# Patient Record
Sex: FEMALE | Race: BLACK OR AFRICAN AMERICAN | NOT HISPANIC OR LATINO
[De-identification: names, ages, dates, MRNs, and addresses within clinical notes are randomized per-mention and may not be internally consistent; named-entity substitution may affect disease eponyms.]

---

## 2020-02-03 ENCOUNTER — RESULT REVIEW (OUTPATIENT)
Age: 45
End: 2020-02-03

## 2022-02-16 ENCOUNTER — APPOINTMENT (OUTPATIENT)
Dept: HUMAN REPRODUCTION | Facility: CLINIC | Age: 47
End: 2022-02-16
Payer: COMMERCIAL

## 2022-02-16 PROCEDURE — 99205 OFFICE O/P NEW HI 60 MIN: CPT | Mod: 25

## 2022-02-16 PROCEDURE — 36415 COLL VENOUS BLD VENIPUNCTURE: CPT

## 2022-02-16 PROCEDURE — 76830 TRANSVAGINAL US NON-OB: CPT

## 2022-02-17 PROBLEM — Z00.00 ENCOUNTER FOR PREVENTIVE HEALTH EXAMINATION: Status: ACTIVE | Noted: 2022-02-17

## 2022-02-21 ENCOUNTER — APPOINTMENT (OUTPATIENT)
Dept: HUMAN REPRODUCTION | Facility: CLINIC | Age: 47
End: 2022-02-21

## 2022-02-23 ENCOUNTER — APPOINTMENT (OUTPATIENT)
Dept: HUMAN REPRODUCTION | Facility: CLINIC | Age: 47
End: 2022-02-23
Payer: COMMERCIAL

## 2022-02-23 PROCEDURE — 76831 ECHO EXAM UTERUS: CPT

## 2022-02-23 PROCEDURE — 58340 CATHETER FOR HYSTEROGRAPHY: CPT

## 2022-02-25 ENCOUNTER — OUTPATIENT (OUTPATIENT)
Dept: OUTPATIENT SERVICES | Facility: HOSPITAL | Age: 47
LOS: 1 days | Discharge: HOME | End: 2022-02-25

## 2022-02-25 ENCOUNTER — OUTPATIENT (OUTPATIENT)
Dept: OUTPATIENT SERVICES | Facility: HOSPITAL | Age: 47
LOS: 1 days | End: 2022-02-25
Payer: COMMERCIAL

## 2022-02-25 ENCOUNTER — APPOINTMENT (OUTPATIENT)
Dept: RADIOLOGY | Facility: HOSPITAL | Age: 47
End: 2022-02-25

## 2022-02-25 DIAGNOSIS — Z00.00 ENCOUNTER FOR GENERAL ADULT MEDICAL EXAMINATION WITHOUT ABNORMAL FINDINGS: ICD-10-CM

## 2022-02-25 DIAGNOSIS — D64.9 ANEMIA, UNSPECIFIED: ICD-10-CM

## 2022-02-25 DIAGNOSIS — N97.1 FEMALE INFERTILITY OF TUBAL ORIGIN: ICD-10-CM

## 2022-02-25 PROCEDURE — 51610 INJECTION FOR BLADDER X-RAY: CPT

## 2022-02-25 PROCEDURE — 74450 X-RAY URETHRA/BLADDER: CPT

## 2022-02-25 PROCEDURE — 74740 X-RAY FEMALE GENITAL TRACT: CPT | Mod: 26

## 2022-02-25 PROCEDURE — 58340 CATHETER FOR HYSTEROGRAPHY: CPT

## 2022-03-01 ENCOUNTER — RESULT REVIEW (OUTPATIENT)
Age: 47
End: 2022-03-01

## 2022-03-01 ENCOUNTER — APPOINTMENT (OUTPATIENT)
Dept: HEMATOLOGY ONCOLOGY | Facility: CLINIC | Age: 47
End: 2022-03-01
Payer: COMMERCIAL

## 2022-03-01 ENCOUNTER — TRANSCRIPTION ENCOUNTER (OUTPATIENT)
Age: 47
End: 2022-03-01

## 2022-03-01 VITALS
WEIGHT: 175 LBS | HEIGHT: 66.5 IN | DIASTOLIC BLOOD PRESSURE: 94 MMHG | HEART RATE: 106 BPM | SYSTOLIC BLOOD PRESSURE: 138 MMHG | OXYGEN SATURATION: 100 % | BODY MASS INDEX: 27.79 KG/M2

## 2022-03-01 LAB
BASOPHILS # BLD AUTO: 0.1 K/UL — SIGNIFICANT CHANGE UP (ref 0–0.2)
BASOPHILS NFR BLD AUTO: 1 % — SIGNIFICANT CHANGE UP (ref 0–2)
EOSINOPHIL # BLD AUTO: 0.2 K/UL — SIGNIFICANT CHANGE UP (ref 0–0.5)
EOSINOPHIL NFR BLD AUTO: 2.4 % — SIGNIFICANT CHANGE UP (ref 0–6)
HCT VFR BLD CALC: 32 % — LOW (ref 34.5–45)
HGB BLD-MCNC: 9.2 G/DL — LOW (ref 11.5–15.5)
LYMPHOCYTES # BLD AUTO: 1.9 K/UL — SIGNIFICANT CHANGE UP (ref 1–3.3)
LYMPHOCYTES # BLD AUTO: 30.7 % — SIGNIFICANT CHANGE UP (ref 13–44)
MCHC RBC-ENTMCNC: 26.2 PG — LOW (ref 27–34)
MCHC RBC-ENTMCNC: 28.7 G/DL — LOW (ref 32–36)
MCV RBC AUTO: 91.2 FL — SIGNIFICANT CHANGE UP (ref 80–100)
MONOCYTES # BLD AUTO: 0.4 K/UL — SIGNIFICANT CHANGE UP (ref 0–0.9)
MONOCYTES NFR BLD AUTO: 6.6 % — SIGNIFICANT CHANGE UP (ref 2–14)
NEUTROPHILS # BLD AUTO: 3.7 K/UL — SIGNIFICANT CHANGE UP (ref 1.8–7.4)
NEUTROPHILS NFR BLD AUTO: 59.2 % — SIGNIFICANT CHANGE UP (ref 43–77)
PLATELET # BLD AUTO: 387 K/UL — SIGNIFICANT CHANGE UP (ref 150–400)
RBC # BLD: 3.51 M/UL — LOW (ref 3.8–5.2)
RBC # FLD: 22.4 % — HIGH (ref 10.3–14.5)
WBC # BLD: 6.3 K/UL — SIGNIFICANT CHANGE UP (ref 3.8–10.5)
WBC # FLD AUTO: 6.3 K/UL — SIGNIFICANT CHANGE UP (ref 3.8–10.5)

## 2022-03-01 PROCEDURE — 99204 OFFICE O/P NEW MOD 45 MIN: CPT

## 2022-03-01 NOTE — CONSULT LETTER
[Dear  ___] : Dear  [unfilled], [Consult Letter:] : I had the pleasure of evaluating your patient, [unfilled]. [Please see my note below.] : Please see my note below. [Consult Closing:] : Thank you very much for allowing me to participate in the care of this patient.  If you have any questions, please do not hesitate to contact me. [Sincerely,] : Sincerely, [DrTa  ___] : Dr. REAVES [FreeTextEntry3] : Tricia Bolden MD\par Medical Oncology/Hematology\par [facility] [place], [facility]\par \par \par [facility] at [facility]\par

## 2022-03-01 NOTE — ASSESSMENT
[FreeTextEntry1] : 45 y/o F patient with  with secondary infertility,  was referred to our office with anemia. Hgb of 7.7 on 22. She's on oral iron but intolerant, causes nausea which thereby causes noncompliance. \par She likely has underlying iron deficiency anemia due to GYN losses +/- inadequate intake from nutrition.\par \par \par Labs on 2022: WBC 6.68, Hgb 7.7, HCT 27.2%, , Vit D 12.3 \par \par Plan: \par Iron panel, b12, folate, cbc today\par Plan for feraheme weekly x 2 for less visits as she lives far \par Follow-up in 6 months

## 2022-03-01 NOTE — HISTORY OF PRESENT ILLNESS
[de-identified] : 47 y/o F patient with  with secondary infertility, was referred to our office with anemia. \par Patient with a Hgb of 7.7 on 22 and was advised to restart oral iron.\par \par Labs on 2022: WBC 6.68, Hgb 7.7, HCT 27.2%, , Vit D 12.3 \par \par Patient presents today for initial visit. \par Notes h/o anemia long standing with intermittent iron use.  Currently on slow release iron but taking not taking it regularly. Does not tolerate well, notes nausea.\par No history of blood transfusions\par Fatigue\par Admits PARK \par Craving for ice in the past month. \par No melena, blood in stool.\par No blood in urine.\par No difficulty breathing. No palpitations \par No weight gain/loss\par Reports leg cramping. Describes "Anthony horse" leg sensation. \par Reports h/o 2 small fibroids\par 3 months of heavy periods in the past year.\par Does not eat red meat. \par \par JAVAD: Dr. Lockett ([facility]) \par \par \par PMHx: Anemia \par FMHx: Denies\par Sx: , abdominoplasty   \par Socx: Never smoke, No alcohol use \par \par

## 2022-03-01 NOTE — ADDENDUM
[FreeTextEntry1] : Documented by Mainor Hunt acting as scribe for Dr. Bolden on 03/01/2022. \par \par All Medical record entries made by the Scribe were at my, Dr. Bolden's, direction and personally dictated by me on 03/01/2022. I have reviewed the chart and agree that the record accurately reflects my personal performance of the history, physical exam, assessment and plan. I have also personally directed, reviewed, and agreed with the discharge instructions.

## 2022-03-03 ENCOUNTER — APPOINTMENT (OUTPATIENT)
Dept: HUMAN REPRODUCTION | Facility: CLINIC | Age: 47
End: 2022-03-03
Payer: COMMERCIAL

## 2022-03-03 PROCEDURE — 99214 OFFICE O/P EST MOD 30 MIN: CPT

## 2022-03-07 LAB
ALBUMIN SERPL ELPH-MCNC: 4.6 G/DL
ALP BLD-CCNC: 71 U/L
ALT SERPL-CCNC: 11 U/L
ANION GAP SERPL CALC-SCNC: 10 MMOL/L
AST SERPL-CCNC: 15 U/L
BILIRUB SERPL-MCNC: 0.3 MG/DL
BUN SERPL-MCNC: 12 MG/DL
CALCIUM SERPL-MCNC: 9.7 MG/DL
CHLORIDE SERPL-SCNC: 104 MMOL/L
CO2 SERPL-SCNC: 27 MMOL/L
CREAT SERPL-MCNC: 0.59 MG/DL
EGFR: 113 ML/MIN/1.73M2
FERRITIN SERPL-MCNC: 36 NG/ML
FOLATE SERPL-MCNC: >20 NG/ML
GLUCOSE SERPL-MCNC: 96 MG/DL
IRON SATN MFR SERPL: 24 %
IRON SERPL-MCNC: 105 UG/DL
POTASSIUM SERPL-SCNC: 4.1 MMOL/L
PROT SERPL-MCNC: 7.6 G/DL
SODIUM SERPL-SCNC: 142 MMOL/L
TIBC SERPL-MCNC: 432 UG/DL
UIBC SERPL-MCNC: 327 UG/DL
VIT B12 SERPL-MCNC: 584 PG/ML

## 2022-03-10 ENCOUNTER — NON-APPOINTMENT (OUTPATIENT)
Age: 47
End: 2022-03-10

## 2022-03-15 ENCOUNTER — APPOINTMENT (OUTPATIENT)
Dept: OBGYN | Facility: CLINIC | Age: 47
End: 2022-03-15
Payer: COMMERCIAL

## 2022-03-15 VITALS
DIASTOLIC BLOOD PRESSURE: 93 MMHG | HEART RATE: 106 BPM | SYSTOLIC BLOOD PRESSURE: 149 MMHG | WEIGHT: 175 LBS | BODY MASS INDEX: 28.12 KG/M2 | HEIGHT: 66 IN

## 2022-03-15 DIAGNOSIS — Z01.419 ENCOUNTER FOR GYNECOLOGICAL EXAMINATION (GENERAL) (ROUTINE) W/OUT ABNORMAL FINDINGS: ICD-10-CM

## 2022-03-15 PROCEDURE — 99386 PREV VISIT NEW AGE 40-64: CPT

## 2022-03-15 NOTE — HISTORY OF PRESENT ILLNESS
[FreeTextEntry1] : 47 y/o  LMP 22 female presents as new patient for annual wellness visit and to establish care. Pt has been attempting pregnancy for 1 yr and is currently seeing JAVAD.\par \par Recent SHG/HTT on 22 reveals mildly enlarged uterine cavity with 12 mm SMC fibroid, 50% in cavity, The fallopian tubes are patent.\par \par Recent labs reveals anemia- last Hb 9.2 Pt follows with heme and plans for Iron infusions\par \par \par  [N] : Patient does not use contraception [Y] : Patient is sexually active [Mammogramdate] : 2020 [PapSmeardate] : 2020

## 2022-03-15 NOTE — PLAN
[FreeTextEntry1] : SM myoma--plan for ART.\par Discussed options of D&C hysteroscopy with resection.  R/A/B discussed.  Pt desires resection of IM myoma.\par Booking done\par -pap done today\par -rx breast mammo/sono\par -colon screening reviewed, age 46\par -rto 1 year

## 2022-03-15 NOTE — END OF VISIT
[FreeTextEntry3] : I, Liz Tapan, acted as a scribe on behalf of Dr. Lauren Hernandez on 03/15/2022.\par \par All medical entries made by the scribe were at my, Dr. Lauren Hernandez, direction and personally dictated by me on 03/15/2022.. I have reviewed the chart and agree that the record accurately reflects my personal performance of the history, physical exam, assessment and plan. I have also personally directed, reviewed, and agreed with the chart.

## 2022-03-17 ENCOUNTER — APPOINTMENT (OUTPATIENT)
Age: 47
End: 2022-03-17

## 2022-03-18 DIAGNOSIS — D50.9 IRON DEFICIENCY ANEMIA, UNSPECIFIED: ICD-10-CM

## 2022-03-24 ENCOUNTER — APPOINTMENT (OUTPATIENT)
Age: 47
End: 2022-03-24

## 2022-03-30 ENCOUNTER — APPOINTMENT (OUTPATIENT)
Dept: OBGYN | Facility: CLINIC | Age: 47
End: 2022-03-30

## 2022-03-31 ENCOUNTER — OUTPATIENT (OUTPATIENT)
Dept: OUTPATIENT SERVICES | Facility: HOSPITAL | Age: 47
LOS: 1 days | End: 2022-03-31

## 2022-03-31 VITALS
RESPIRATION RATE: 16 BRPM | DIASTOLIC BLOOD PRESSURE: 80 MMHG | SYSTOLIC BLOOD PRESSURE: 134 MMHG | TEMPERATURE: 98 F | HEIGHT: 67 IN | OXYGEN SATURATION: 99 % | HEART RATE: 78 BPM | WEIGHT: 177.91 LBS

## 2022-03-31 DIAGNOSIS — Z98.890 OTHER SPECIFIED POSTPROCEDURAL STATES: Chronic | ICD-10-CM

## 2022-03-31 DIAGNOSIS — D25.0 SUBMUCOUS LEIOMYOMA OF UTERUS: ICD-10-CM

## 2022-03-31 DIAGNOSIS — D50.9 IRON DEFICIENCY ANEMIA, UNSPECIFIED: ICD-10-CM

## 2022-03-31 DIAGNOSIS — Z98.891 HISTORY OF UTERINE SCAR FROM PREVIOUS SURGERY: Chronic | ICD-10-CM

## 2022-03-31 LAB
ANION GAP SERPL CALC-SCNC: 13 MMOL/L — SIGNIFICANT CHANGE UP (ref 7–14)
BUN SERPL-MCNC: 17 MG/DL — SIGNIFICANT CHANGE UP (ref 7–23)
CALCIUM SERPL-MCNC: 9.2 MG/DL — SIGNIFICANT CHANGE UP (ref 8.4–10.5)
CHLORIDE SERPL-SCNC: 105 MMOL/L — SIGNIFICANT CHANGE UP (ref 98–107)
CO2 SERPL-SCNC: 24 MMOL/L — SIGNIFICANT CHANGE UP (ref 22–31)
CREAT SERPL-MCNC: 0.59 MG/DL — SIGNIFICANT CHANGE UP (ref 0.5–1.3)
EGFR: 113 ML/MIN/1.73M2 — SIGNIFICANT CHANGE UP
GLUCOSE SERPL-MCNC: 80 MG/DL — SIGNIFICANT CHANGE UP (ref 70–99)
HCG UR QL: NEGATIVE — SIGNIFICANT CHANGE UP
HCT VFR BLD CALC: 36 % — SIGNIFICANT CHANGE UP (ref 34.5–45)
HGB BLD-MCNC: 10.8 G/DL — LOW (ref 11.5–15.5)
MCHC RBC-ENTMCNC: 27.5 PG — SIGNIFICANT CHANGE UP (ref 27–34)
MCHC RBC-ENTMCNC: 30 GM/DL — LOW (ref 32–36)
MCV RBC AUTO: 91.6 FL — SIGNIFICANT CHANGE UP (ref 80–100)
NRBC # BLD: 0 /100 WBCS — SIGNIFICANT CHANGE UP
NRBC # FLD: 0 K/UL — SIGNIFICANT CHANGE UP
PLATELET # BLD AUTO: 326 K/UL — SIGNIFICANT CHANGE UP (ref 150–400)
POTASSIUM SERPL-MCNC: 4.7 MMOL/L — SIGNIFICANT CHANGE UP (ref 3.5–5.3)
POTASSIUM SERPL-SCNC: 4.7 MMOL/L — SIGNIFICANT CHANGE UP (ref 3.5–5.3)
RBC # BLD: 3.93 M/UL — SIGNIFICANT CHANGE UP (ref 3.8–5.2)
RBC # FLD: 17.3 % — HIGH (ref 10.3–14.5)
SODIUM SERPL-SCNC: 142 MMOL/L — SIGNIFICANT CHANGE UP (ref 135–145)
WBC # BLD: 7.13 K/UL — SIGNIFICANT CHANGE UP (ref 3.8–10.5)
WBC # FLD AUTO: 7.13 K/UL — SIGNIFICANT CHANGE UP (ref 3.8–10.5)

## 2022-03-31 RX ORDER — SODIUM CHLORIDE 9 MG/ML
1000 INJECTION, SOLUTION INTRAVENOUS
Refills: 0 | Status: DISCONTINUED | OUTPATIENT
Start: 2022-04-04 | End: 2022-04-18

## 2022-03-31 NOTE — H&P PST ADULT - PROBLEM SELECTOR PLAN 1
Patient tentatively scheduled for dilation curettage hysteroscopy with meño on 04/04/2022.  Pre-op instructions provided. Pt given verbal and written instructions with teach back on  pepcid. Pt verbalized understanding with return demonstration.   Pt strongly advised to follow up with surgeon to discuss COVID testing requirements prior to procedure.   Urine cup provided for day of procedure pregnancy test.

## 2022-03-31 NOTE — H&P PST ADULT - ADMIT DATE
Spoke to Nathalie to let her know she can  [place] [facility] [place]  
31-Mar-2022

## 2022-03-31 NOTE — H&P PST ADULT - HISTORY OF PRESENT ILLNESS
46 year old female with pre op dx of submucous leiomyoma of uterus is scheduled for dilation curettage hysteroscopy with symphion.

## 2022-03-31 NOTE — H&P PST ADULT - NSICDXPASTSURGICALHX_GEN_ALL_CORE_FT
PAST SURGICAL HISTORY:  H/O abdominoplasty 2018    H/O bilateral breast reduction surgery 2018    H/O  section 2010

## 2022-03-31 NOTE — H&P PST ADULT - NS ATTEND AMEND GEN_ALL_CORE FT
R/A/B of D&C hysteroscopy with resection of presumed myoma and EUA by learners discussed with pt including but not limited to infection, bleeding, and injury to bladder/uterus/bowel.

## 2022-04-01 NOTE — ASU PATIENT PROFILE, ADULT - MUTUALITY COMMENT, PROFILE
none Discussed with Pt her ability to have questions answered by dr clifton & anesthesia before going into the OR

## 2022-04-01 NOTE — ASU PATIENT PROFILE, ADULT - FALL HARM RISK - PATIENT NEEDS ASSISTANCE
No assistance needed crestor  Last Written Prescription Date:  11/20/16  Last Fill Quantity: 90,   # refills: 3  Last Office Visit : 2/2/18  Future Office visit:  5/11/18

## 2022-04-03 ENCOUNTER — TRANSCRIPTION ENCOUNTER (OUTPATIENT)
Age: 47
End: 2022-04-03

## 2022-04-04 ENCOUNTER — RESULT REVIEW (OUTPATIENT)
Age: 47
End: 2022-04-04

## 2022-04-04 ENCOUNTER — OUTPATIENT (OUTPATIENT)
Dept: OUTPATIENT SERVICES | Facility: HOSPITAL | Age: 47
LOS: 1 days | Discharge: HOME | End: 2022-04-04
Payer: COMMERCIAL

## 2022-04-04 ENCOUNTER — TRANSCRIPTION ENCOUNTER (OUTPATIENT)
Age: 47
End: 2022-04-04

## 2022-04-04 ENCOUNTER — APPOINTMENT (OUTPATIENT)
Dept: OBGYN | Facility: HOSPITAL | Age: 47
End: 2022-04-04

## 2022-04-04 VITALS
TEMPERATURE: 99 F | SYSTOLIC BLOOD PRESSURE: 136 MMHG | WEIGHT: 177.91 LBS | RESPIRATION RATE: 14 BRPM | OXYGEN SATURATION: 100 % | HEART RATE: 109 BPM | DIASTOLIC BLOOD PRESSURE: 96 MMHG | HEIGHT: 67 IN

## 2022-04-04 VITALS
HEART RATE: 84 BPM | RESPIRATION RATE: 12 BRPM | SYSTOLIC BLOOD PRESSURE: 120 MMHG | TEMPERATURE: 98 F | DIASTOLIC BLOOD PRESSURE: 68 MMHG | OXYGEN SATURATION: 100 %

## 2022-04-04 DIAGNOSIS — D25.0 SUBMUCOUS LEIOMYOMA OF UTERUS: ICD-10-CM

## 2022-04-04 DIAGNOSIS — Z98.890 OTHER SPECIFIED POSTPROCEDURAL STATES: Chronic | ICD-10-CM

## 2022-04-04 DIAGNOSIS — Z98.891 HISTORY OF UTERINE SCAR FROM PREVIOUS SURGERY: Chronic | ICD-10-CM

## 2022-04-04 LAB
CYTOLOGY CVX/VAG DOC THIN PREP: ABNORMAL
HCG UR QL: NEGATIVE — SIGNIFICANT CHANGE UP
HPV HIGH+LOW RISK DNA PNL CVX: NOT DETECTED

## 2022-04-04 PROCEDURE — 88305 TISSUE EXAM BY PATHOLOGIST: CPT | Mod: 26

## 2022-04-04 PROCEDURE — 58561 HYSTEROSCOPY REMOVE MYOMA: CPT | Mod: GC

## 2022-04-04 RX ORDER — OXYCODONE HYDROCHLORIDE 5 MG/1
10 TABLET ORAL ONCE
Refills: 0 | Status: DISCONTINUED | OUTPATIENT
Start: 2022-04-04 | End: 2022-04-04

## 2022-04-04 RX ORDER — FENTANYL CITRATE 50 UG/ML
50 INJECTION INTRAVENOUS
Refills: 0 | Status: DISCONTINUED | OUTPATIENT
Start: 2022-04-04 | End: 2022-04-04

## 2022-04-04 RX ORDER — SODIUM CHLORIDE 9 MG/ML
1000 INJECTION, SOLUTION INTRAVENOUS
Refills: 0 | Status: DISCONTINUED | OUTPATIENT
Start: 2022-04-04 | End: 2022-04-18

## 2022-04-04 RX ORDER — FENTANYL CITRATE 50 UG/ML
25 INJECTION INTRAVENOUS
Refills: 0 | Status: DISCONTINUED | OUTPATIENT
Start: 2022-04-04 | End: 2022-04-04

## 2022-04-04 RX ORDER — OXYCODONE HYDROCHLORIDE 5 MG/1
5 TABLET ORAL ONCE
Refills: 0 | Status: DISCONTINUED | OUTPATIENT
Start: 2022-04-04 | End: 2022-04-04

## 2022-04-04 RX ORDER — ONDANSETRON 8 MG/1
4 TABLET, FILM COATED ORAL ONCE
Refills: 0 | Status: DISCONTINUED | OUTPATIENT
Start: 2022-04-04 | End: 2022-04-18

## 2022-04-04 NOTE — BRIEF OPERATIVE NOTE - OPERATION/FINDINGS
Pt prepped and draped in standard sterile fashion. Cervix was dilated to 7 mm and hysteroscope was placed. Uterus was distilled with normal saline and b/l ostia were visualized.  R anterior submucosal fibroid was visualized and removed via symphion resection. Possible submucosal fibroid tissue visualized on L lateral aspect of the uterus removed with symphion resection. Curettage was conducted. Endometrial curettage was sent to pathology. EBL was minimal. Fluid deficit was 200cc.  There were no complications

## 2022-04-04 NOTE — ASU DISCHARGE PLAN (ADULT/PEDIATRIC) - CARE PROVIDER_API CALL
Lauren Hernandez (MD)  Obstetrics and Gynecology  [facility] [place] [address]  Phone: [phone]  Fax: [phone]  Follow Up Time: 2 weeks

## 2022-04-04 NOTE — ASU DISCHARGE PLAN (ADULT/PEDIATRIC) - PAIN MANAGEMENT
Take over the counter pain medication You received IV Tylenol for pain management at 7. Please DO NOT take any Tylenol (Acetaminophen) containing products, such as Vicodin, Percocet, Excedrin, and cold medications for the next 6 hours (until 1 PM). DO NOT TAKE MORE THAN 4000 MG OF TYLENOL in a 24 hour period./Take over the counter pain medication You received IV Tylenol for pain management at 7. Please DO NOT take any Tylenol (Acetaminophen) containing products, such as Vicodin, Percocet, Excedrin, and cold medications for the next 6 hours (until 1 PM). DO NOT TAKE MORE THAN 4000 MG OF TYLENOL in a 24 hour period.. You received IV Toradol for pain management at 8 am. Please DO NOT take Motrin/Ibuprofen/Advil/Aleve/NSAIDs (Non-Steroidal Anti-Inflammatory Drugs) for the next 6 hours (until 2PM)./Take over the counter pain medication

## 2022-04-04 NOTE — ASU DISCHARGE PLAN (ADULT/PEDIATRIC) - NS MD DC FALL RISK RISK
For information on Fall & Injury Prevention, visit: [email or web address] OR  [email or web address] OR  https://www.cdc.gov/steadi/patient.html

## 2022-04-08 PROBLEM — D25.0 SUBMUCOUS LEIOMYOMA OF UTERUS: Chronic | Status: ACTIVE | Noted: 2022-03-31

## 2022-04-08 PROBLEM — Z86.2 PERSONAL HISTORY OF DISEASES OF THE BLOOD AND BLOOD-FORMING ORGANS AND CERTAIN DISORDERS INVOLVING THE IMMUNE MECHANISM: Chronic | Status: ACTIVE | Noted: 2022-03-31

## 2022-04-14 LAB — SURGICAL PATHOLOGY STUDY: SIGNIFICANT CHANGE UP

## 2022-04-27 ENCOUNTER — APPOINTMENT (OUTPATIENT)
Dept: HUMAN REPRODUCTION | Facility: CLINIC | Age: 47
End: 2022-04-27

## 2022-04-27 ENCOUNTER — APPOINTMENT (OUTPATIENT)
Dept: OBGYN | Facility: CLINIC | Age: 47
End: 2022-04-27

## 2022-04-27 DIAGNOSIS — D25.0 SUBMUCOUS LEIOMYOMA OF UTERUS: ICD-10-CM

## 2022-04-27 NOTE — HISTORY OF PRESENT ILLNESS
[Fever] : no fever [Vomiting] : no vomiting [Vaginal Bleeding] : no vaginal bleeding [Pelvic Pressure] : no pelvic pressure [Pathology reviewed] : pathology reviewed [de-identified] : Telehealth

## 2022-04-27 NOTE — REASON FOR VISIT
[Home] : at home, [unfilled] , at the time of the visit. [Medical Office: ([facility])___] : at the medical office located in  [Verbal consent obtained from patient] : the patient, [unfilled] [Procedure: ___] : Procedure: [unfilled] [Indication: ___] : Indication: [unfilled]

## 2022-06-13 ENCOUNTER — APPOINTMENT (OUTPATIENT)
Age: 47
End: 2022-06-13

## 2022-08-30 ENCOUNTER — OUTPATIENT (OUTPATIENT)
Dept: OUTPATIENT SERVICES | Facility: HOSPITAL | Age: 47
LOS: 1 days | Discharge: HOME | End: 2022-08-30

## 2022-08-30 DIAGNOSIS — D50.9 IRON DEFICIENCY ANEMIA, UNSPECIFIED: ICD-10-CM

## 2022-08-30 DIAGNOSIS — Z98.890 OTHER SPECIFIED POSTPROCEDURAL STATES: Chronic | ICD-10-CM

## 2022-08-30 DIAGNOSIS — Z98.891 HISTORY OF UTERINE SCAR FROM PREVIOUS SURGERY: Chronic | ICD-10-CM

## 2022-09-06 ENCOUNTER — RESULT REVIEW (OUTPATIENT)
Age: 47
End: 2022-09-06

## 2022-09-06 ENCOUNTER — APPOINTMENT (OUTPATIENT)
Dept: HEMATOLOGY ONCOLOGY | Facility: CLINIC | Age: 47
End: 2022-09-06

## 2022-09-06 VITALS
SYSTOLIC BLOOD PRESSURE: 133 MMHG | BODY MASS INDEX: 28.61 KG/M2 | HEART RATE: 89 BPM | OXYGEN SATURATION: 99 % | WEIGHT: 178 LBS | DIASTOLIC BLOOD PRESSURE: 87 MMHG | HEIGHT: 66 IN

## 2022-09-06 LAB
BASOPHILS # BLD AUTO: 0.1 K/UL — SIGNIFICANT CHANGE UP (ref 0–0.2)
BASOPHILS NFR BLD AUTO: 1.3 % — SIGNIFICANT CHANGE UP (ref 0–2)
EOSINOPHIL # BLD AUTO: 0.1 K/UL — SIGNIFICANT CHANGE UP (ref 0–0.5)
EOSINOPHIL NFR BLD AUTO: 2.4 % — SIGNIFICANT CHANGE UP (ref 0–6)
HCT VFR BLD CALC: 34.7 % — SIGNIFICANT CHANGE UP (ref 34.5–45)
HGB BLD-MCNC: 10.8 G/DL — LOW (ref 11.5–15.5)
LYMPHOCYTES # BLD AUTO: 1.8 K/UL — SIGNIFICANT CHANGE UP (ref 1–3.3)
LYMPHOCYTES # BLD AUTO: 29.2 % — SIGNIFICANT CHANGE UP (ref 13–44)
MCHC RBC-ENTMCNC: 29.6 PG — SIGNIFICANT CHANGE UP (ref 27–34)
MCHC RBC-ENTMCNC: 31 G/DL — LOW (ref 32–36)
MCV RBC AUTO: 95.4 FL — SIGNIFICANT CHANGE UP (ref 80–100)
MONOCYTES # BLD AUTO: 0.5 K/UL — SIGNIFICANT CHANGE UP (ref 0–0.9)
MONOCYTES NFR BLD AUTO: 7.5 % — SIGNIFICANT CHANGE UP (ref 2–14)
NEUTROPHILS # BLD AUTO: 3.6 K/UL — SIGNIFICANT CHANGE UP (ref 1.8–7.4)
NEUTROPHILS NFR BLD AUTO: 59.6 % — SIGNIFICANT CHANGE UP (ref 43–77)
PLATELET # BLD AUTO: 252 K/UL — SIGNIFICANT CHANGE UP (ref 150–400)
RBC # BLD: 3.64 M/UL — LOW (ref 3.8–5.2)
RBC # FLD: 11.5 % — SIGNIFICANT CHANGE UP (ref 10.3–14.5)
WBC # BLD: 6.1 K/UL — SIGNIFICANT CHANGE UP (ref 3.8–10.5)
WBC # FLD AUTO: 6.1 K/UL — SIGNIFICANT CHANGE UP (ref 3.8–10.5)

## 2022-09-06 PROCEDURE — 99214 OFFICE O/P EST MOD 30 MIN: CPT

## 2022-09-06 RX ORDER — CHOLECALCIFEROL (VITAMIN D3) 10(400)/ML
160 (50 FE) DROPS ORAL
Refills: 0 | Status: DISCONTINUED | COMMUNITY
End: 2022-09-06

## 2022-09-08 NOTE — CONSULT LETTER
[Dear  ___] : Dear  [unfilled], [Consult Letter:] : I had the pleasure of evaluating your patient, [unfilled]. [Please see my note below.] : Please see my note below. [Consult Closing:] : Thank you very much for allowing me to participate in the care of this patient.  If you have any questions, please do not hesitate to contact me. [Sincerely,] : Sincerely, [DrTa  ___] : Dr. REAEVS [FreeTextEntry3] : Tricia Bolden MD\par Medical Oncology/Hematology\par [facility] [place], [facility]\par \par \par [facility] at [facility]\par

## 2022-09-08 NOTE — PHYSICAL EXAM
[Normal] : RRR, normal S1S2, no murmurs, rubs, gallops [de-identified] : supple [de-identified] : CTA

## 2022-09-08 NOTE — ASSESSMENT
[FreeTextEntry1] : 47 y/o F patient with  with secondary infertility,  was referred to our office with anemia. Hgb of 7.7 on 22. She's on oral iron but intolerant, causes nausea which thereby causes noncompliance. \par She likely has underlying iron deficiency anemia due to GYN losses +/- inadequate intake from nutrition.\par \par \par : WBC 6.68K Hgb 7.7g HCT 27.2%, PLT 323K\par 3/1/2022 WBC 6.3 K HGB 9.2 g HCT 32.0 %  K Ferritin 36 ng/mL B12 584 pg/mL Folate >20.0 ng/mL \par \par S/p Feraheme on 3/17/22 and 3/24/22\par Menorrhagia continues\par Reviewed CBC from today WBC 6.1 K HGB 10.8 g HCT 34.7 %  K \par \par Plan: \par Iron panel, B12/Folate ordered. If iron deficient will plan for Feraheme weekly x 2\par Menorrhagia: f/u with GYN Dr. Hernandez \par Will obtain lab results from 22 from PCP Dr. Bhakta \par Advised GI evaluation: colonoscopy \par Follow up in 3 months with Dr Bolden or sooner if needed

## 2022-09-09 LAB
ALBUMIN SERPL ELPH-MCNC: 4.3 G/DL
ALP BLD-CCNC: 60 U/L
ALT SERPL-CCNC: 21 U/L
ANION GAP SERPL CALC-SCNC: 12 MMOL/L
AST SERPL-CCNC: 24 U/L
BILIRUB SERPL-MCNC: 0.5 MG/DL
BUN SERPL-MCNC: 17 MG/DL
CALCIUM SERPL-MCNC: 9.2 MG/DL
CHLORIDE SERPL-SCNC: 105 MMOL/L
CO2 SERPL-SCNC: 25 MMOL/L
CREAT SERPL-MCNC: 0.59 MG/DL
EGFR: 113 ML/MIN/1.73M2
FERRITIN SERPL-MCNC: 35 NG/ML
FOLATE SERPL-MCNC: >20 NG/ML
GLUCOSE SERPL-MCNC: 93 MG/DL
IRON SATN MFR SERPL: 78 %
IRON SERPL-MCNC: 261 UG/DL
POTASSIUM SERPL-SCNC: 4.1 MMOL/L
PROT SERPL-MCNC: 6.9 G/DL
SODIUM SERPL-SCNC: 142 MMOL/L
TIBC SERPL-MCNC: 335 UG/DL
UIBC SERPL-MCNC: 74 UG/DL
VIT B12 SERPL-MCNC: 689 PG/ML

## 2022-09-13 ENCOUNTER — APPOINTMENT (OUTPATIENT)
Age: 47
End: 2022-09-13

## 2022-09-20 ENCOUNTER — RESULT REVIEW (OUTPATIENT)
Age: 47
End: 2022-09-20

## 2022-09-20 ENCOUNTER — APPOINTMENT (OUTPATIENT)
Age: 47
End: 2022-09-20

## 2022-09-20 LAB
BASOPHILS # BLD AUTO: 0.1 K/UL — SIGNIFICANT CHANGE UP (ref 0–0.2)
BASOPHILS NFR BLD AUTO: 1.4 % — SIGNIFICANT CHANGE UP (ref 0–2)
EOSINOPHIL # BLD AUTO: 0.1 K/UL — SIGNIFICANT CHANGE UP (ref 0–0.5)
EOSINOPHIL NFR BLD AUTO: 2 % — SIGNIFICANT CHANGE UP (ref 0–6)
HCT VFR BLD CALC: 40.1 % — SIGNIFICANT CHANGE UP (ref 34.5–45)
HGB BLD-MCNC: 12.2 G/DL — SIGNIFICANT CHANGE UP (ref 11.5–15.5)
LYMPHOCYTES # BLD AUTO: 2.1 K/UL — SIGNIFICANT CHANGE UP (ref 1–3.3)
LYMPHOCYTES # BLD AUTO: 35.7 % — SIGNIFICANT CHANGE UP (ref 13–44)
MCHC RBC-ENTMCNC: 30.2 PG — SIGNIFICANT CHANGE UP (ref 27–34)
MCHC RBC-ENTMCNC: 30.5 G/DL — LOW (ref 32–36)
MCV RBC AUTO: 99 FL — SIGNIFICANT CHANGE UP (ref 80–100)
MONOCYTES # BLD AUTO: 0.5 K/UL — SIGNIFICANT CHANGE UP (ref 0–0.9)
MONOCYTES NFR BLD AUTO: 7.9 % — SIGNIFICANT CHANGE UP (ref 2–14)
NEUTROPHILS # BLD AUTO: 3.1 K/UL — SIGNIFICANT CHANGE UP (ref 1.8–7.4)
NEUTROPHILS NFR BLD AUTO: 53.1 % — SIGNIFICANT CHANGE UP (ref 43–77)
PLATELET # BLD AUTO: 286 K/UL — SIGNIFICANT CHANGE UP (ref 150–400)
RBC # BLD: 4.05 M/UL — SIGNIFICANT CHANGE UP (ref 3.8–5.2)
RBC # FLD: 11.7 % — SIGNIFICANT CHANGE UP (ref 10.3–14.5)
WBC # BLD: 5.8 K/UL — SIGNIFICANT CHANGE UP (ref 3.8–10.5)
WBC # FLD AUTO: 5.8 K/UL — SIGNIFICANT CHANGE UP (ref 3.8–10.5)

## 2022-11-16 ENCOUNTER — OUTPATIENT (OUTPATIENT)
Dept: OUTPATIENT SERVICES | Facility: HOSPITAL | Age: 47
LOS: 1 days | Discharge: HOME | End: 2022-11-16

## 2022-11-16 DIAGNOSIS — Z98.890 OTHER SPECIFIED POSTPROCEDURAL STATES: Chronic | ICD-10-CM

## 2022-11-16 DIAGNOSIS — Z98.891 HISTORY OF UTERINE SCAR FROM PREVIOUS SURGERY: Chronic | ICD-10-CM

## 2022-11-16 DIAGNOSIS — D50.9 IRON DEFICIENCY ANEMIA, UNSPECIFIED: ICD-10-CM

## 2022-12-21 DIAGNOSIS — D64.9 ANEMIA, UNSPECIFIED: ICD-10-CM

## 2022-12-22 ENCOUNTER — APPOINTMENT (OUTPATIENT)
Dept: HEMATOLOGY ONCOLOGY | Facility: CLINIC | Age: 47
End: 2022-12-22

## 2023-01-05 NOTE — HISTORY OF PRESENT ILLNESS
[facility]  [address] [place] [address]  Dept: [phone]  Dept Fax: [phone]    Sherley Dimas is a 71 y.o. female who presents today for her medical conditions/complaints of   Chief Complaint   Patient presents with    Cough     Dry           HPI:     /76   Pulse 92   Temp 97.4 °F (36.3 °C)   Resp 16   Wt 161 lb 3.2 oz (73.1 kg)   SpO2 98%   BMI 29.72 kg/m²       Cough  This is a new problem. Episode onset: x 2 weeks. The problem has been unchanged. The cough is Non-productive. Pertinent negatives include no chest pain, ear pain, fever, sore throat, shortness of breath or wheezing. She has tried OTC cough suppressant for the symptoms. The treatment provided moderate relief. Her past medical history is significant for bronchitis and pneumonia. Past Medical History:   Diagnosis Date    Anxiety     Diverticulosis of colon     DJD (degenerative joint disease) 1/7/2015    Family history of colon cancer     Generalized OA 1/7/2015    GERD (gastroesophageal reflux disease)     Hyperlipidemia 1/7/2015    Irritable bowel syndrome     Osteoarthritis         Past Surgical History:   Procedure Laterality Date    CHOLECYSTECTOMY      COLONOSCOPY  2004    normal    COLONOSCOPY  4/25/14    diverticulosis    COLONOSCOPY N/A 2/9/2018    COLONOSCOPY POLYPECTOMY COLD BIOPSY performed by Brenda Mirza MD at [address] [address]  02/09/2018    Spasms in the sigmoid colon. No colitis or ileitis seen.     DILATION AND CURETTAGE OF UTERUS  1970    DILATION AND CURETTAGE OF UTERUS  1991    ENDOSCOPY, COLON, DIAGNOSTIC      JOINT REPLACEMENT Right 8/26/2013    hip    TONSILLECTOMY  1954    UPPER GASTROINTESTINAL ENDOSCOPY  2004    normal    UPPER GASTROINTESTINAL ENDOSCOPY  4/25/14    esophagitis with bx       Family History   Problem Relation Age of Onset    Other Mother         had many stomach surgeries COPD Father     Cancer Father         colon    Other Brother         sepsis    Cancer Maternal Grandfather         colon       Social History     Tobacco Use    Smoking status: Former     Packs/day: 1.50     Years: 20.00     Pack years: 30.00     Types: Cigarettes     Quit date: 1990     Years since quittin.0    Smokeless tobacco: Never   Substance Use Topics    Alcohol use: Yes        Prior to Visit Medications    Medication Sig Taking? Authorizing Provider   benzonatate (TESSALON) 200 MG capsule Take 1 capsule by mouth 3 times daily as needed for Cough Yes DERW Patel CNP   pantoprazole (PROTONIX) 40 MG tablet Take 1 tablet by mouth daily Yes [employer], APRN - NP   hydrOXYzine HCl (ATARAX) 25 MG tablet TAKE 1 TABLET BY MOUTH EVERY DAY AS NEEDED FOR ANXIETY Yes Yane Clark MD   rosuvastatin (CRESTOR) 5 MG tablet TAKE 1 TABLET BY MOUTH NIGHTLY Yes Yane Clark MD   aspirin 81 MG EC tablet TAKE 1 TABLET BY MOUTH EVERY DAY Yes Yane Clark MD   ZINC PO Take by mouth Yes Historical Provider, MD   albuterol sulfate HFA (PROAIR HFA) 108 (90 Base) MCG/ACT inhaler Inhale 2 puffs into the lungs every 6 hours as needed for Wheezing Yes DREW eYe CNP   diclofenac sodium (VOLTAREN) 1 % GEL Apply topically 2 times daily Yes DREW Yee CNP   vitamin C (ASCORBIC ACID) 500 MG tablet Take 500 mg by mouth daily Yes Historical Provider, MD   valACYclovir (VALTREX) 1 g tablet Take 1,000 mg by mouth 2 times daily Patient uses PRN Yes Historical Provider, MD   Coenzyme Q10 (COQ10 PO) Take by mouth Yes Historical Provider, MD   Multiple Vitamins-Minerals (MULTIVITAMIN PO) Take by mouth Yes Historical Provider, MD   vitamin D (ERGOCALCIFEROL) 400 UNITS CAPS Take 400 Units by mouth daily Yes Historical Provider, MD   calcium carbonate-vitamin D 600-200 MG-UNIT TABS Take  by mouth.  Yes Historical Provider, MD   Cyanocobalamin (VITAMIN B-12 CR PO) Take by mouth  Patient not taking: No sig reported  Historical Provider, MD       Allergies   Allergen Reactions    Nalbuphine      Other reaction(s): claustophobia; confusion    Sulfa Antibiotics Hives    Formaldehyde Rash    Nickel Rash    Morphine And Related Other (See Comments)    Oxycodone          Subjective:      Review of Systems   Constitutional:  Negative for fever. HENT:  Negative for ear pain, sinus pain, sore throat and voice change. Respiratory:  Positive for cough. Negative for shortness of breath and wheezing. Cardiovascular:  Negative for chest pain and palpitations. Gastrointestinal: Negative. Genitourinary: Negative. Objective:     Physical Exam  Constitutional:       Appearance: Normal appearance. She is not ill-appearing, toxic-appearing or diaphoretic. HENT:      Nose: Nose normal.      Mouth/Throat:      Mouth: Mucous membranes are moist.      Pharynx: Oropharynx is clear. No oropharyngeal exudate or posterior oropharyngeal erythema. Eyes:      Conjunctiva/sclera: Conjunctivae normal.   Cardiovascular:      Rate and Rhythm: Normal rate and regular rhythm. Heart sounds: Normal heart sounds. Pulmonary:      Effort: No tachypnea, accessory muscle usage or respiratory distress. Breath sounds: Normal breath sounds and air entry. No stridor. No wheezing, rhonchi or rales. Skin:     Coloration: Skin is not pale. Neurological:      General: No focal deficit present. Mental Status: She is alert. MEDICAL DECISION MAKING Assessment/Plan:     [employer] was seen today for cough. Diagnoses and all orders for this visit:    Bronchitis  -     POCT Influenza A/B  -     COVID-19; Future  -     benzonatate (TESSALON) 200 MG capsule; Take 1 capsule by mouth 3 times daily as needed for Cough    Encounter for screening for COVID-19  -     COVID-19; Future    Based on the patient's history and exam will treat as bronchitis. She appeared non-toxic.   The patient does not have clinical findings suggestive of pneumonia. There is no abnormal vital signs (pulse is not greater than 100/ minute, respirations are not greater than 24/ minute, temperature is not greater than 38 degrees Celsius, or oxygen saturation less than 95 percent) There is no tachypnea, rales, or signs of parenchymal consolidation on exam. There are no changes in mental status or behavioral changes. Pt to fill and take medications as prescribed. Rest, increase fluids. Return if no improvement in symptoms. Go to the ER for any emergent concern. Results for orders placed or performed in visit on 01/05/23   POCT Influenza A/B   Result Value Ref Range    Influenza A Ab negative     Influenza B Ab negative        Patient counseled:     Patient given educational materials - see patientinstructions. Discussed use, benefit, and side effects of prescribed medications. All patient questions answered. Pt verbalized understanding. Instructed to continue current medications, diet and exercise. Patient agreed with treatment plan. Follow up as directed.      Electronically signed by DREW Gonzales CNP on 1/5/2023 at 5:26 PM [de-identified] : 47 y/o F patient with  with secondary infertility, was referred to our office with anemia. \par Patient with a Hgb of 7.7 on 22 and was advised to restart oral iron.\par \par Labs on 2022: WBC 6.68, Hgb 7.7, HCT 27.2%, , Vit D 12.3 \par \par Patient presents today for initial visit. \par Notes h/o anemia long standing with intermittent iron use.  Currently on slow release iron but taking not taking it regularly. Does not tolerate well, notes nausea.\par No history of blood transfusions\par Fatigue\par Admits PARK \par Craving for ice in the past month. \par No melena, blood in stool.\par No blood in urine.\par No difficulty breathing. No palpitations \par No weight gain/loss\par Reports leg cramping. Describes "Anthony horse" leg sensation. \par Reports h/o 2 small fibroids\par 3 months of heavy periods in the past year.\par Does not eat red meat. \par \par JAVAD: Dr. Lockett ([facility]) \par \par \par PMHx: Anemia \par FMHx: Denies\par Sx: , abdominoplasty   \par Socx: Never smoke, No alcohol use \par \par  [de-identified] : Returns for follow up visit. S/p Feraheme on 3/17/22 and 3/24/22\par \par Reports energy level significantly improved after Feraheme and recently fatigue has returned \par Followed up with PCP Dr Bhakta on 8/25/22, provided CBC results from 8/25/22 visit HGB 12g HCT 38%\par LMP 9/2/22, reports first two days were heavy requiring pad change every 4 hours, now is light, currently on day 3 \par Prior LMP was on 8/5/22, second day was heavy, then was light, lasted for 5 days \par S/p myoma removal in 4/2022 with Dr. Hernandez \par Denies ha, dizziness, dyspnea \par Denies blood in stool, black stool, hematuria \par Does not consume meat \par Good appetite. Denies abdominal pain \par Denies weight loss \par Pending to start IVF fertility in 11/2022\par Feels well

## 2023-10-23 ENCOUNTER — EMERGENCY (EMERGENCY)
Facility: HOSPITAL | Age: 48
LOS: 1 days | Discharge: HOME | End: 2023-10-23
Attending: EMERGENCY MEDICINE
Payer: COMMERCIAL

## 2023-10-23 VITALS
WEIGHT: 164.91 LBS | DIASTOLIC BLOOD PRESSURE: 103 MMHG | TEMPERATURE: 99 F | OXYGEN SATURATION: 100 % | SYSTOLIC BLOOD PRESSURE: 151 MMHG | HEART RATE: 102 BPM | RESPIRATION RATE: 16 BRPM

## 2023-10-23 DIAGNOSIS — Z98.890 OTHER SPECIFIED POSTPROCEDURAL STATES: Chronic | ICD-10-CM

## 2023-10-23 DIAGNOSIS — Z98.891 HISTORY OF UTERINE SCAR FROM PREVIOUS SURGERY: Chronic | ICD-10-CM

## 2023-10-23 LAB
ALBUMIN SERPL ELPH-MCNC: 4.7 G/DL — SIGNIFICANT CHANGE UP (ref 3.3–5)
ALBUMIN SERPL ELPH-MCNC: 4.7 G/DL — SIGNIFICANT CHANGE UP (ref 3.3–5)
ALP SERPL-CCNC: 45 U/L — SIGNIFICANT CHANGE UP (ref 40–120)
ALP SERPL-CCNC: 45 U/L — SIGNIFICANT CHANGE UP (ref 40–120)
ALT FLD-CCNC: 28 U/L — SIGNIFICANT CHANGE UP (ref 10–45)
ALT FLD-CCNC: 28 U/L — SIGNIFICANT CHANGE UP (ref 10–45)
ANION GAP SERPL CALC-SCNC: 14 MMOL/L — SIGNIFICANT CHANGE UP (ref 5–17)
ANION GAP SERPL CALC-SCNC: 14 MMOL/L — SIGNIFICANT CHANGE UP (ref 5–17)
APPEARANCE UR: CLEAR — SIGNIFICANT CHANGE UP
APPEARANCE UR: CLEAR — SIGNIFICANT CHANGE UP
APTT BLD: 31.2 SEC — SIGNIFICANT CHANGE UP (ref 24.5–35.6)
APTT BLD: 31.2 SEC — SIGNIFICANT CHANGE UP (ref 24.5–35.6)
AST SERPL-CCNC: 101 U/L — HIGH (ref 10–40)
AST SERPL-CCNC: 101 U/L — HIGH (ref 10–40)
BACTERIA # UR AUTO: NEGATIVE — SIGNIFICANT CHANGE UP
BACTERIA # UR AUTO: NEGATIVE — SIGNIFICANT CHANGE UP
BASOPHILS # BLD AUTO: 0.05 K/UL — SIGNIFICANT CHANGE UP (ref 0–0.2)
BASOPHILS # BLD AUTO: 0.05 K/UL — SIGNIFICANT CHANGE UP (ref 0–0.2)
BASOPHILS NFR BLD AUTO: 0.7 % — SIGNIFICANT CHANGE UP (ref 0–2)
BASOPHILS NFR BLD AUTO: 0.7 % — SIGNIFICANT CHANGE UP (ref 0–2)
BILIRUB SERPL-MCNC: 0.6 MG/DL — SIGNIFICANT CHANGE UP (ref 0.2–1.2)
BILIRUB SERPL-MCNC: 0.6 MG/DL — SIGNIFICANT CHANGE UP (ref 0.2–1.2)
BILIRUB UR-MCNC: NEGATIVE — SIGNIFICANT CHANGE UP
BILIRUB UR-MCNC: NEGATIVE — SIGNIFICANT CHANGE UP
BUN SERPL-MCNC: 11 MG/DL — SIGNIFICANT CHANGE UP (ref 7–23)
BUN SERPL-MCNC: 11 MG/DL — SIGNIFICANT CHANGE UP (ref 7–23)
CALCIUM SERPL-MCNC: 9.6 MG/DL — SIGNIFICANT CHANGE UP (ref 8.4–10.5)
CALCIUM SERPL-MCNC: 9.6 MG/DL — SIGNIFICANT CHANGE UP (ref 8.4–10.5)
CHLORIDE SERPL-SCNC: 101 MMOL/L — SIGNIFICANT CHANGE UP (ref 96–108)
CHLORIDE SERPL-SCNC: 101 MMOL/L — SIGNIFICANT CHANGE UP (ref 96–108)
CO2 SERPL-SCNC: 16 MMOL/L — LOW (ref 22–31)
CO2 SERPL-SCNC: 16 MMOL/L — LOW (ref 22–31)
COLOR SPEC: COLORLESS — SIGNIFICANT CHANGE UP
COLOR SPEC: COLORLESS — SIGNIFICANT CHANGE UP
CREAT SERPL-MCNC: 0.48 MG/DL — LOW (ref 0.5–1.3)
CREAT SERPL-MCNC: 0.48 MG/DL — LOW (ref 0.5–1.3)
DIFF PNL FLD: ABNORMAL
DIFF PNL FLD: ABNORMAL
EGFR: 118 ML/MIN/1.73M2 — SIGNIFICANT CHANGE UP
EGFR: 118 ML/MIN/1.73M2 — SIGNIFICANT CHANGE UP
EOSINOPHIL # BLD AUTO: 0.11 K/UL — SIGNIFICANT CHANGE UP (ref 0–0.5)
EOSINOPHIL # BLD AUTO: 0.11 K/UL — SIGNIFICANT CHANGE UP (ref 0–0.5)
EOSINOPHIL NFR BLD AUTO: 1.5 % — SIGNIFICANT CHANGE UP (ref 0–6)
EOSINOPHIL NFR BLD AUTO: 1.5 % — SIGNIFICANT CHANGE UP (ref 0–6)
EPI CELLS # UR: 3 /HPF — SIGNIFICANT CHANGE UP
EPI CELLS # UR: 3 /HPF — SIGNIFICANT CHANGE UP
GLUCOSE SERPL-MCNC: 89 MG/DL — SIGNIFICANT CHANGE UP (ref 70–99)
GLUCOSE SERPL-MCNC: 89 MG/DL — SIGNIFICANT CHANGE UP (ref 70–99)
GLUCOSE UR QL: NEGATIVE — SIGNIFICANT CHANGE UP
GLUCOSE UR QL: NEGATIVE — SIGNIFICANT CHANGE UP
HCG SERPL-ACNC: 11.3 MIU/ML — HIGH
HCG SERPL-ACNC: 11.3 MIU/ML — HIGH
HCT VFR BLD CALC: 40.9 % — SIGNIFICANT CHANGE UP (ref 34.5–45)
HCT VFR BLD CALC: 40.9 % — SIGNIFICANT CHANGE UP (ref 34.5–45)
HGB BLD-MCNC: 13 G/DL — SIGNIFICANT CHANGE UP (ref 11.5–15.5)
HGB BLD-MCNC: 13 G/DL — SIGNIFICANT CHANGE UP (ref 11.5–15.5)
HYALINE CASTS # UR AUTO: 1 /LPF — SIGNIFICANT CHANGE UP (ref 0–2)
HYALINE CASTS # UR AUTO: 1 /LPF — SIGNIFICANT CHANGE UP (ref 0–2)
IMM GRANULOCYTES NFR BLD AUTO: 0.3 % — SIGNIFICANT CHANGE UP (ref 0–0.9)
IMM GRANULOCYTES NFR BLD AUTO: 0.3 % — SIGNIFICANT CHANGE UP (ref 0–0.9)
INR BLD: 1.07 RATIO — SIGNIFICANT CHANGE UP (ref 0.85–1.18)
INR BLD: 1.07 RATIO — SIGNIFICANT CHANGE UP (ref 0.85–1.18)
KETONES UR-MCNC: NEGATIVE — SIGNIFICANT CHANGE UP
KETONES UR-MCNC: NEGATIVE — SIGNIFICANT CHANGE UP
LEUKOCYTE ESTERASE UR-ACNC: NEGATIVE — SIGNIFICANT CHANGE UP
LEUKOCYTE ESTERASE UR-ACNC: NEGATIVE — SIGNIFICANT CHANGE UP
LYMPHOCYTES # BLD AUTO: 2.26 K/UL — SIGNIFICANT CHANGE UP (ref 1–3.3)
LYMPHOCYTES # BLD AUTO: 2.26 K/UL — SIGNIFICANT CHANGE UP (ref 1–3.3)
LYMPHOCYTES # BLD AUTO: 30.7 % — SIGNIFICANT CHANGE UP (ref 13–44)
LYMPHOCYTES # BLD AUTO: 30.7 % — SIGNIFICANT CHANGE UP (ref 13–44)
MCHC RBC-ENTMCNC: 28.4 PG — SIGNIFICANT CHANGE UP (ref 27–34)
MCHC RBC-ENTMCNC: 28.4 PG — SIGNIFICANT CHANGE UP (ref 27–34)
MCHC RBC-ENTMCNC: 31.8 GM/DL — LOW (ref 32–36)
MCHC RBC-ENTMCNC: 31.8 GM/DL — LOW (ref 32–36)
MCV RBC AUTO: 89.5 FL — SIGNIFICANT CHANGE UP (ref 80–100)
MCV RBC AUTO: 89.5 FL — SIGNIFICANT CHANGE UP (ref 80–100)
MONOCYTES # BLD AUTO: 0.43 K/UL — SIGNIFICANT CHANGE UP (ref 0–0.9)
MONOCYTES # BLD AUTO: 0.43 K/UL — SIGNIFICANT CHANGE UP (ref 0–0.9)
MONOCYTES NFR BLD AUTO: 5.9 % — SIGNIFICANT CHANGE UP (ref 2–14)
MONOCYTES NFR BLD AUTO: 5.9 % — SIGNIFICANT CHANGE UP (ref 2–14)
NEUTROPHILS # BLD AUTO: 4.48 K/UL — SIGNIFICANT CHANGE UP (ref 1.8–7.4)
NEUTROPHILS # BLD AUTO: 4.48 K/UL — SIGNIFICANT CHANGE UP (ref 1.8–7.4)
NEUTROPHILS NFR BLD AUTO: 60.9 % — SIGNIFICANT CHANGE UP (ref 43–77)
NEUTROPHILS NFR BLD AUTO: 60.9 % — SIGNIFICANT CHANGE UP (ref 43–77)
NITRITE UR-MCNC: NEGATIVE — SIGNIFICANT CHANGE UP
NITRITE UR-MCNC: NEGATIVE — SIGNIFICANT CHANGE UP
NRBC # BLD: 0 /100 WBCS — SIGNIFICANT CHANGE UP (ref 0–0)
NRBC # BLD: 0 /100 WBCS — SIGNIFICANT CHANGE UP (ref 0–0)
PH UR: 5.5 — SIGNIFICANT CHANGE UP (ref 5–8)
PH UR: 5.5 — SIGNIFICANT CHANGE UP (ref 5–8)
PLATELET # BLD AUTO: 294 K/UL — SIGNIFICANT CHANGE UP (ref 150–400)
PLATELET # BLD AUTO: 294 K/UL — SIGNIFICANT CHANGE UP (ref 150–400)
POTASSIUM SERPL-MCNC: SIGNIFICANT CHANGE UP MMOL/L (ref 3.5–5.3)
POTASSIUM SERPL-MCNC: SIGNIFICANT CHANGE UP MMOL/L (ref 3.5–5.3)
POTASSIUM SERPL-SCNC: SIGNIFICANT CHANGE UP MMOL/L (ref 3.5–5.3)
POTASSIUM SERPL-SCNC: SIGNIFICANT CHANGE UP MMOL/L (ref 3.5–5.3)
PROT SERPL-MCNC: 8.9 G/DL — HIGH (ref 6–8.3)
PROT SERPL-MCNC: 8.9 G/DL — HIGH (ref 6–8.3)
PROT UR-MCNC: NEGATIVE — SIGNIFICANT CHANGE UP
PROT UR-MCNC: NEGATIVE — SIGNIFICANT CHANGE UP
PROTHROM AB SERPL-ACNC: 11.8 SEC — SIGNIFICANT CHANGE UP (ref 9.5–13)
PROTHROM AB SERPL-ACNC: 11.8 SEC — SIGNIFICANT CHANGE UP (ref 9.5–13)
RBC # BLD: 4.57 M/UL — SIGNIFICANT CHANGE UP (ref 3.8–5.2)
RBC # BLD: 4.57 M/UL — SIGNIFICANT CHANGE UP (ref 3.8–5.2)
RBC # FLD: 13.3 % — SIGNIFICANT CHANGE UP (ref 10.3–14.5)
RBC # FLD: 13.3 % — SIGNIFICANT CHANGE UP (ref 10.3–14.5)
RBC CASTS # UR COMP ASSIST: 1 /HPF — SIGNIFICANT CHANGE UP (ref 0–4)
RBC CASTS # UR COMP ASSIST: 1 /HPF — SIGNIFICANT CHANGE UP (ref 0–4)
SODIUM SERPL-SCNC: 131 MMOL/L — LOW (ref 135–145)
SODIUM SERPL-SCNC: 131 MMOL/L — LOW (ref 135–145)
SP GR SPEC: 1 — LOW (ref 1.01–1.02)
SP GR SPEC: 1 — LOW (ref 1.01–1.02)
UROBILINOGEN FLD QL: NEGATIVE — SIGNIFICANT CHANGE UP
UROBILINOGEN FLD QL: NEGATIVE — SIGNIFICANT CHANGE UP
WBC # BLD: 7.35 K/UL — SIGNIFICANT CHANGE UP (ref 3.8–10.5)
WBC # BLD: 7.35 K/UL — SIGNIFICANT CHANGE UP (ref 3.8–10.5)
WBC # FLD AUTO: 7.35 K/UL — SIGNIFICANT CHANGE UP (ref 3.8–10.5)
WBC # FLD AUTO: 7.35 K/UL — SIGNIFICANT CHANGE UP (ref 3.8–10.5)
WBC UR QL: 5 /HPF — SIGNIFICANT CHANGE UP (ref 0–5)
WBC UR QL: 5 /HPF — SIGNIFICANT CHANGE UP (ref 0–5)

## 2023-10-23 PROCEDURE — 76817 TRANSVAGINAL US OBSTETRIC: CPT | Mod: 26

## 2023-10-23 PROCEDURE — 76801 OB US < 14 WKS SINGLE FETUS: CPT | Mod: 26

## 2023-10-23 PROCEDURE — 93975 VASCULAR STUDY: CPT | Mod: 26

## 2023-10-23 PROCEDURE — 99284 EMERGENCY DEPT VISIT MOD MDM: CPT

## 2023-10-23 PROCEDURE — 99285 EMERGENCY DEPT VISIT HI MDM: CPT

## 2023-10-23 RX ORDER — ACETAMINOPHEN 500 MG
650 TABLET ORAL ONCE
Refills: 0 | Status: COMPLETED | OUTPATIENT
Start: 2023-10-23 | End: 2023-10-23

## 2023-10-23 RX ADMIN — Medication 650 MILLIGRAM(S): at 23:31

## 2023-10-23 NOTE — CONSULT NOTE ADULT - ASSESSMENT
THIS NOTE IS INCOMPLETE, FINAL RECOMMENDATIONS PENDING DISCUSSION WITH ATTENDING        -No acute GYN intervention required at this time  - Rh (+), no need for rhogam  -Discussed with patient that this pregnancy may represent an early pregnancy, a non-viable pregnancy, or an ectopic pregnancy  -Given the morbidity associated w/ ectopic pregnancy, we will follow the patient closely to trend the b-hcg levels and obtain repeat US as indicated; counseled the patient extensively re: the importance of follow-up  -Patient to return in 48 hours for a repeat b-hcg/sono in the ED or at our clinic (she can call [phone] to make an appointment if her insurance is accepted)  -Precautions given  -All questions answered to the apparent satisfaction of the patient    Patient seen and d/w   Meghana Rodrigez, PGY2   47y  at 5w2d by LMP 23 who presents with vaginal bleeding and cramping x 1 day. TVUS consistent with PUL. Pt with no active bleeding on exam, clinically and hemodynamically stable.    -No acute GYN intervention required at this time  -Please obtain type and screen, if Rh(-), will need rhogam  -Discussed with patient that this pregnancy may represent an early pregnancy, a non-viable pregnancy, or an ectopic pregnancy  -Given the morbidity associated w/ ectopic pregnancy, we will follow the patient closely to trend the b-hcg levels and obtain repeat US as indicated; counseled the patient extensively re: the importance of follow-up  -Patient to return in 48 hours for a repeat b-hcg/sono in the ED or in Dr. Hernandez's office  - return precautions reviewed: should patient develop fever, heavy vaginal bleeding (saturating a pad an hour for two hours), purulent discharge, worsening abdominal pain, she should return to the ED  -All questions answered to the apparent satisfaction of the patient    Patient seen and d/w Jocelyne Rodrigez, PGY2   47y  at 5w2d by LMP 23 who presents with vaginal bleeding and cramping x 1 day. TVUS consistent with PUL. Pt with no active bleeding on exam, clinically and hemodynamically stable.    -No acute GYN intervention required at this time  -Please obtain type and screen, if Rh(-), will need rhogam  -Discussed with patient that this pregnancy may represent an early pregnancy, a non-viable pregnancy, or an ectopic pregnancy  -Given the morbidity associated w/ ectopic pregnancy, we will follow the patient closely to trend the b-hcg levels and obtain repeat US as indicated; counseled the patient extensively re: the importance of follow-up  -Patient to return in 48 hours for a repeat b-hcg/sono in the ED or in Dr. Hernandez's office  - return precautions reviewed: should patient develop fever, heavy vaginal bleeding (saturating a pad an hour for two hours), purulent discharge, worsening abdominal pain, she should return to the ED  -All questions answered to the apparent satisfaction of the patient    Patient d/w Dr. Jocelyne Rodrigez, PGY2

## 2023-10-23 NOTE — ED PROVIDER NOTE - OBJECTIVE STATEMENT
47-year-old female  approximately 6 weeks pregnant per last menstrual period on  presenting to the emergency department with vaginal bleeding and lower abdominal cramping and back pain.  Patient reports that she took a at home pregnancy test where she confirmed her pregnancy.  Patient reports that initially it was mild bleeding and spotting but the flow has increased throughout the day.  Patient reports that the pain was mostly on the left side.  The patient does not have a confirmed IUP as of yet. the patient denies any passage of clots.  The patient denies nausea, vomiting, fevers, chills, chest pain, shortness of breath, headache or visual changes.

## 2023-10-23 NOTE — ED PROVIDER NOTE - NSFOLLOWUPINSTRUCTIONS_ED_ALL_ED_FT
Please follow up with your OBGYN with 2 days for repeat blood work, HCG and BMP.   Return to the ER for any new or concerning symptoms.     You may take 650 mg acetaminophen six hours as needed for pain.   Drink plenty of fluids and rest.    Miscarriage  A miscarriage is the loss of a pregnancy before the 20th week of pregnancy. Sometimes, a pregnancy ends before a woman knows that she is pregnant.    If you lose a pregnancy, talk with your doctor about:  Questions you have about the loss of your baby.  How to work through your grief.  Plans for future pregnancy.  What are the causes?  Many times, the cause of this condition is not known.    What increases the risk?  These things may make a pregnant woman more likely to lose a pregnancy:    Certain health conditions    Conditions that affect hormones, such as:  Thyroid disease.  Polycystic ovary syndrome.  Diabetes.  A disease that causes the body's disease-fighting system to attack itself by mistake.  Infections.  Bleeding problems.  Being very overweight.  Lifestyle factors    Using products that have tobacco or nicotine in them.  Being around tobacco smoke.  Having alcohol.  Having a lot of caffeine.  Using drugs.  Problems with reproductive organs or parts    Having a cervix that opens and thins before your due date. The cervix is the lowest part of your womb.  Having Asherman syndrome, which leads to:  Scars in the womb.  The womb being abnormal in shape.  Growths (fibroids) in the womb.  Problems in the body that are present at birth.  Infection of the cervix or womb.  Personal or health history    Injury.  Having lost a pregnancy before.  Being younger than age 18 or older than age 35.  Being around a harmful substance, such as radiation.  Having lead or other heavy metals in:  Things you eat or drink.  The air around you.  Using certain medicines.  What are the signs or symptoms?  Blood or spots of blood coming from the vagina. You may also have cramps or pain.  Pain or cramps in the belly or low back.  Fluid or tissue coming out of the vagina.  How is this treated?  Sometimes, treatment is not needed.    If you need treatment, you may be treated with:  A procedure to open the cervix more and take tissue out of the womb.  Medicines. You may get a shot of medicine called Rho(D) immune globulin.  Follow these instructions at home:  Medicines    Take over-the-counter and prescription medicines only as told by your doctor.  If you were prescribed antibiotic medicine, take it as told by your doctor. Do not stop taking it even if you start to feel better.  Activity    Rest as told by your doctor. Ask your doctor what activities are safe for you.  Have someone help you at home during this time.  General instructions      Watch how much tissue comes out of the vagina.  Watch the size of any blood clots that come out of the vagina.  Do not have sex or douche until your doctor says it is okay.  Do not put things, such as tampons, in your vagina until your doctor says it is okay.  To help you and your partner with grieving:  Talk with your doctor.  See a counselor.  When you are ready, talk with your doctor about:  Things to do for your health.  How you can be healthy if you get pregnant again.  Keep all follow-up visits.  Where to find more information  The American College of Obstetricians and Gynecologists: acog.org  [place] Department of Health and Human Services Office of Women's Health: hrsa.gov/office-womens-health  Contact a doctor if:  You have a fever or chills.  There is bad-smelling fluid coming from the vagina.  You have more bleeding.  Tissue or clots of blood come out of your vagina.  Get help right away if:  You have very bad cramps or pain in your back or belly.  You soak more than 2 large pads in an hour for more than 2 hours.  You get light-headed or weak.  You faint.  You feel sad, and you have sad thoughts a lot of the time.  You think about hurting yourself.  Get help right away if you feel like you may hurt yourself or others, or have thoughts about taking your own life. Go to your nearest emergency room or:  Call your local emergency services (911 in the [place]  Call the National Suicide Prevention Lifeline at 1-944.451.1394 or 506 in the [place] This is open 24 hours a day.  Text the Crisis Text Line at 412383.  Summary  A miscarriage is the loss of a pregnancy before the 20th week of pregnancy. Sometimes, a pregnancy ends before a woman knows that she is pregnant.  Follow instructions from your doctor about medicines and activity.  To help you and your partner with grieving, talk with your doctor or a counselor.  Keep all follow-up visits.

## 2023-10-23 NOTE — ED PROVIDER NOTE - ATTENDING CONTRIBUTION TO CARE
Patient is a 47-year-old female, G2, P1,  LMP 9/16, about 6 weeks pregnant, here for lower abdominal cramping, vaginal bleeding.  Patient states that she had a positive home pregnancy test.  She reports that she has had worsening cramping and bleeding throughout the day today.  She reports that she had left-sided pelvic pain.  Denies any dysuria.  No nausea, vomiting, fevers, chills.  She denies any chest pain or shortness of breath.      VS noted  Gen. no acute distress, Non toxic   HEENT: EOMI, mmm  Lungs: CTAB/L no C/ W /R   CVS: RRR   Abd; Soft non tender, non distended   : done by Dr. Avalos, blood in vaginal vault, left adnexal tenderness  Ext: no edema  Skin: no rash  Neuro AAOx3 non focal clear speech  a/p:  heavy vaginal bleeding and abdominal cramping in setting of positive home pregnancy test.  Patient likely has a missed or complete AB.  Differential also includes bleeding in early pregnancy, ectopic pregnancy.  Plan for labs, UA, TVUS, OB/GYN consult.  - Dimas ESQUIVEL

## 2023-10-23 NOTE — ED PROVIDER NOTE - PATIENT PORTAL LINK FT
You can access the FollowMyHealth Patient Portal offered by [facility] by registering at the following website: [email or web address]. By joining [facility]’s FollowMyHealth portal, you will also be able to view your health information using other applications (apps) compatible with our system.

## 2023-10-23 NOTE — ED PROVIDER NOTE - PHYSICAL EXAMINATION
GENERAL: Awake, alert, NAD  HEENT: NC/AT, moist mucous membranes, PERRL, EOMI  LUNGS: CTAB, no wheezes or crackles   CARDIAC: RRR, no m/r/g  ABDOMEN: Soft, non tender, non distended, no rebound, no guarding  BACK: No midline spinal tenderness, no CVA tenderness  : pooling in the vaginal canal and left lower quadrant tenderness to palpation.  EXT: No edema, no calf tenderness, 2+ DP pulses bilaterally, no deformities.  NEURO: A&Ox3. Moving all extremities.  SKIN: Warm and dry. No rash.  PSYCH: Normal affect.

## 2023-10-23 NOTE — ED PROVIDER NOTE - CHIEF COMPLAINT
Mary Tesfaye is a 53 year old female, [facility]. States she is constantly crying (not crying during assessment.)  + suicidal ideation; states \"I'm homeless and I want to kill myself.\"  + states she hasn't been able to sleep for 3 days.  + back pain, using Flexeril and a medication \"DIC\" not on med list - no relief.   + \"little\" chest pain, intermittent \"small\" - refusing EKG.  Denies SOB.  + numbness in RT fingers - recently had injection in RT hand.  + chronic cough, intermittent, denies that it is worse than usual.    States she is not taking any of her medications except the muscle relaxant and pain medication.   States she does not want any testing, she just wants to be admitted for her mental health.  Requesting to go to [facility].    Visit Vitals  BP (!) 174/98 (BP Location: LUE - Left upper extremity)   Pulse 66   Resp 18   Ht 5' 4\" (1.626 m)   Wt 116.6 kg   LMP 12/13/2016 (Approximate)   BMI 44.11 kg/m²     Patient would like communication of their results via:     Cell Phone:   Telephone Information:   Mobile [phone]     Okay to leave a message containing results? Yes    Medications verified and reviewed.  Allergies verified and reviewed.  Tobacco history verified 8/24/2020.  PCP verified or updated. Nate George MD    Reviewed with charge RN and Dr Jiménez; 911 initiated.  Police x 2 arrived and offered transport to [place]; patient refused ambulance.        The patient is a 47y Female complaining of vaginal bleeding.

## 2023-10-23 NOTE — CONSULT NOTE ADULT - SUBJECTIVE AND OBJECTIVE BOX
VICTOR M MOORE  47y  Female [id number]    HPI:        Name of GYN Physician:   OBHx:    GynHx: Denies fibroids, cysts, endometriosis, STI's, Abnormal pap smears   PMH:  PSH:  Meds:  Allx:  Social History:  Denies smoking use, drug use, alcohol use.   +occasional social alcohol use    Vital Signs Last 24 Hrs  T(C): 37 (23 Oct 2023 17:03), Max: 37 (23 Oct 2023 17:03)  T(F): 98.6 (23 Oct 2023 17:03), Max: 98.6 (23 Oct 2023 17:03)  HR: 102 (23 Oct 2023 17:03) (102 - 102)  BP: 151/103 (23 Oct 2023 17:03) (151/103 - 151/103)  BP(mean): --  RR: 16 (23 Oct 2023 17:03) (16 - 16)  SpO2: 100% (23 Oct 2023 17:03) (100% - 100%)    Parameters below as of 23 Oct 2023 17:03  Patient On (Oxygen Delivery Method): room air        Physical Exam:   General: sitting comfortably in bed, NAD   HEENT: neck supple, full ROM  CV: RRR  Lungs: CTA b/l, good air flow b/l   Back: No CVA tenderness  Abd: Soft, non-tender, non-distended.  Bowel sounds present.    :  No bleeding on pad.    External labia wnl.  Bimanual exam with no cervical motion tenderness, uterus wnl, adnexa non palpable b/l.  Cervix closed vs. Cervix dilated  Speculum Exam: No active bleeding from os.  Physiologic discharge.    Ext: non-tender b/l, no edema     LABS:    hC.3                          13.0   7.35  )-----------( 294      ( 23 Oct 2023 18:47 )             40.9     10-23    131<L>  |  101  |  11  ----------------------------<  89  See Note   |  16<L>  |  0.48<L>    Ca    9.6      23 Oct 2023 18:47    TPro  8.9<H>  /  Alb  4.7  /  TBili  0.6  /  DBili  x   /  AST  101<H>  /  ALT  28  /  AlkPhos  45  10-23    I&O's Detail    PT/INR - ( 23 Oct 2023 18:47 )   PT: 11.8 sec;   INR: 1.07 ratio         PTT - ( 23 Oct 2023 18:47 )  PTT:31.2 sec  Urinalysis Basic - ( 23 Oct 2023 18:47 )    Color: Colorless / Appearance: Clear / S.004 / pH: x  Gluc: 89 mg/dL / Ketone: Negative  / Bili: Negative / Urobili: Negative   Blood: x / Protein: Negative / Nitrite: Negative   Leuk Esterase: Negative / RBC: 1 /hpf / WBC 5 /HPF   Sq Epi: x / Non Sq Epi: x / Bacteria: Negative        RADIOLOGY & ADDITIONAL STUDIES:    < from: US OB <=14 Weeks, First Gestation (10.23.23 @ 20:28) >  ACC: [id number] EXAM:  US OB LES THAN 14 WKS 1ST GEST   ORDERED BY:  SUNDAY MCINTYRE     ACC: [id number] EXAM:  US DPLX PELVIC   ORDERED BY:  SUNDAY MCINTYRE     ACC: [id number] EXAM:  US OB TRANSVAGINAL   ORDERED BY:  SUNDAY MCINTYRE     PROCEDURE DATE:  10/23/2023          INTERPRETATION:  CLINICAL INFORMATION: Vaginal bleeding. Beta-hCG of 11.3.    LMP: 2023    Estimated Gestational Age by LMP: 5 weeks and 2 days    COMPARISON: None available.    Endovaginal pelvic sonogram as perorder. Transabdominal pelvic sonogram   was also performed as part of our standard protocol. Color and Spectral   Doppler was performed.    FINDINGS:  Uterus: 12.0 x 7.2 x 7.5 cm. No gestational sac is identified. Intramural   fibroids measuring 2.4 x 2.1 x 2.1 cm and 2.1 x 2.0 x 1.2 cm. The   endometrium measures 10 mm.    Right ovary: Not visualized.  Left ovary: 3.1 x 1.0 x 2.3 cm. Within normal limits. Normal arterial and   venous waveforms.    Fluid: Trace free fluid in the cul-de-sac.    IMPRESSION:  Fibroid uterus.  Neither intrauterine nor extrauterine gestation is identified. Findings   may represent to early to visualize intrauterine pregnancy, nonvisualized   ectopic versus failed first trimester pregnancy. Continual follow-up   serial beta hCG is recommended with repeat ultrasound as needed by the   trending beta values.    --- End of Report ---          ED WOOTEN MD; Resident Radiologist  This document has been electronically signed.  BARRY NICKERSON MD; Attending Radiologist  This document has been electronically signed. Oct 23 2023  9:25PM    < end of copied text >   VICTOR M MOORE  47y  Female [id number]    HPI:  47y  at 5w2d by LMP 23 who presents with vaginal bleeding and cramping x 1 day. Pt says that her cramping has been intermittent and varies in intensity. She says previously her pain was 10/10 but now it is much improved. She has not needed to take any medication for her pain. She has changed her pad once today and has not noticed any clots or passage of tissue. She denies fever, chills, nausea, vomiting, constipation, diarrhea, dysuria.        Name of GYN Physician: Dr. Hernandez  OBHx:     CS for tracing  GynHx: h/o fibroid uterus s/p hysteroscopic myomectomy. Denies cysts, endometriosis, STI's, Abnormal pap smears   PMH: anemia s/p iron infusions  PSH: hysteroscopic myomectomy   Meds: PNV  Allx: NKDA  Social History:  Denies smoking use, drug use, alcohol use. Pt reports feeling safe at home.    Vital Signs Last 24 Hrs  T(C): 37 (23 Oct 2023 17:03), Max: 37 (23 Oct 2023 17:03)  T(F): 98.6 (23 Oct 2023 17:03), Max: 98.6 (23 Oct 2023 17:03)  HR: 102 (23 Oct 2023 17:03) (102 - 102)  BP: 151/103 (23 Oct 2023 17:03) (151/103 - 151/103)  BP(mean): --  RR: 16 (23 Oct 2023 17:03) (16 - 16)  SpO2: 100% (23 Oct 2023 17:03) (100% - 100%)    Parameters below as of 23 Oct 2023 17:03  Patient On (Oxygen Delivery Method): room air        Physical Exam:   General: sitting comfortably in bed, NAD   CV: well perfused  Lungs: breathing comfortably on RA  Abd: Soft, mildly tender in bilateral lower quadrants. No rebound or guarding. No distension.    : External labia wnl.  Bimanual exam with no cervical motion tenderness, uterus wnl, adnexa non palpable b/l.   Speculum Exam: 20cc of blood in vault. No active bleeding from os.  Ext: non-tender b/l, no edema     LABS:    hC.3                          13.0   7.35  )-----------( 294      ( 23 Oct 2023 18:47 )             40.9     10-23    131<L>  |  101  |  11  ----------------------------<  89  See Note   |  16<L>  |  0.48<L>    Ca    9.6      23 Oct 2023 18:47    TPro  8.9<H>  /  Alb  4.7  /  TBili  0.6  /  DBili  x   /  AST  101<H>  /  ALT  28  /  AlkPhos  45  10-23    I&O's Detail    PT/INR - ( 23 Oct 2023 18:47 )   PT: 11.8 sec;   INR: 1.07 ratio         PTT - ( 23 Oct 2023 18:47 )  PTT:31.2 sec  Urinalysis Basic - ( 23 Oct 2023 18:47 )    Color: Colorless / Appearance: Clear / S.004 / pH: x  Gluc: 89 mg/dL / Ketone: Negative  / Bili: Negative / Urobili: Negative   Blood: x / Protein: Negative / Nitrite: Negative   Leuk Esterase: Negative / RBC: 1 /hpf / WBC 5 /HPF   Sq Epi: x / Non Sq Epi: x / Bacteria: Negative        RADIOLOGY & ADDITIONAL STUDIES:    < from: US OB <=14 Weeks, First Gestation (10.23.23 @ 20:28) >  ACC: [id number] EXAM:  US OB LES THAN 14 WKS 1ST GEST   ORDERED BY:  SUNDAY MCINTYRE     ACC: [id number] EXAM:  US DPLX PELVIC   ORDERED BY:  SUNDAY MCINTYRE     ACC: [id number] EXAM:  US OB TRANSVAGINAL   ORDERED BY:  SUNDAY MCINTYRE     PROCEDURE DATE:  10/23/2023          INTERPRETATION:  CLINICAL INFORMATION: Vaginal bleeding. Beta-hCG of 11.3.    LMP: 2023    Estimated Gestational Age by LMP: 5 weeks and 2 days    COMPARISON: None available.    Endovaginal pelvic sonogram as perorder. Transabdominal pelvic sonogram   was also performed as part of our standard protocol. Color and Spectral   Doppler was performed.    FINDINGS:  Uterus: 12.0 x 7.2 x 7.5 cm. No gestational sac is identified. Intramural   fibroids measuring 2.4 x 2.1 x 2.1 cm and 2.1 x 2.0 x 1.2 cm. The   endometrium measures 10 mm.    Right ovary: Not visualized.  Left ovary: 3.1 x 1.0 x 2.3 cm. Within normal limits. Normal arterial and   venous waveforms.    Fluid: Trace free fluid in the cul-de-sac.    IMPRESSION:  Fibroid uterus.  Neither intrauterine nor extrauterine gestation is identified. Findings   may represent to early to visualize intrauterine pregnancy, nonvisualized   ectopic versus failed first trimester pregnancy. Continual follow-up   serial beta hCG is recommended with repeat ultrasound as needed by the   trending beta values.    --- End of Report ---          ED WOOTEN MD; Resident Radiologist  This document has been electronically signed.  BARRY NICKERSON MD; Attending Radiologist  This document has been electronically signed. Oct 23 2023  9:25PM    < end of copied text >

## 2023-10-23 NOTE — ED ADULT NURSE NOTE - OBJECTIVE STATEMENT
47 y.o F AAO4 ambulatory w.o assist presents to the ED with one day of vaginal bleeding. Pt states she is 5 weeks pregnant, this is her second pregnancy, has one living child who is 12 y.o born full term uncomplicated birth, denies any PMH of miscarriage. 47 y.o F AAO4 ambulatory w.o assist presents to the ED with one day of vaginal bleeding. Pt states she is 5 weeks pregnant, this is her second pregnancy, has one living child who is 12 y.o born full term uncomplicated birth, denies any PMH of miscarriage. Pt denies CP, SOB, HA, vision changes, n/v/d, fevers chills, abdominal pain. Upon assessment, abdomen soft and nontender, +strong peripheral pulses, moving all extremities without difficulty.

## 2023-10-23 NOTE — ED PROVIDER NOTE - RAPID ASSESSMENT
47-year-old female  estimated 5 to 6 weeks pregnant last menstrual period was  presenting to the emergency department for the evaluation of vaginal bleeding that began earlier today with a lower back burning sensation.  She endorses lower pelvic cramping and states the bleeding was initially just spotting but flow has increased throughout the day. she reports pain feels more left sided than right sided.  She has not seen a doctor for confirmation of pregnancy at this juncture however did get a positive home pregnancy test.

## 2023-10-23 NOTE — ED PROVIDER NOTE - PROGRESS NOTE DETAILS
Ansley Avalos, PGY-2 DO:  Patient BMP elevated potassium, will rpt BMP but patient would like to go home. Patient elevation likely secondary to hemolysis. Patient will f/u at outpatient OBGYN for rpt beta an df/u potassium there.

## 2023-10-23 NOTE — ED PROVIDER NOTE - CLINICAL SUMMARY MEDICAL DECISION MAKING FREE TEXT BOX
47-year-old female  approximately 6 weeks pregnant per last menstrual period on  presenting with vaginal bleeding and left lower abdominal pain and back pain.  VS nonactionable.  PE pooling in the vaginal canal and left lower quadrant tenderness to palpation.    The differential is not limited to ectopic pregnancy, inevitable , miscarriage, subchorionic hematoma, normal menses.  Will obtain basic labs, coags, transvaginal ultrasound.  Dispo pending lab imaging and reassessment.

## 2023-10-23 NOTE — ED ADULT TRIAGE NOTE - CHIEF COMPLAINT QUOTE
46 yo F pt 5 weeks pregnant with one prior pregnancy p/w vaginal bleeding and abd cramping today soaking less than 1 pad an hour.

## 2023-10-24 LAB
ANION GAP SERPL CALC-SCNC: 12 MMOL/L — SIGNIFICANT CHANGE UP (ref 5–17)
ANION GAP SERPL CALC-SCNC: 12 MMOL/L — SIGNIFICANT CHANGE UP (ref 5–17)
ANION GAP SERPL CALC-SCNC: 9 MMOL/L — SIGNIFICANT CHANGE UP (ref 5–17)
ANION GAP SERPL CALC-SCNC: 9 MMOL/L — SIGNIFICANT CHANGE UP (ref 5–17)
BUN SERPL-MCNC: 11 MG/DL — SIGNIFICANT CHANGE UP (ref 7–23)
CALCIUM SERPL-MCNC: 9.1 MG/DL — SIGNIFICANT CHANGE UP (ref 8.4–10.5)
CALCIUM SERPL-MCNC: 9.1 MG/DL — SIGNIFICANT CHANGE UP (ref 8.4–10.5)
CALCIUM SERPL-MCNC: 9.4 MG/DL — SIGNIFICANT CHANGE UP (ref 8.4–10.5)
CALCIUM SERPL-MCNC: 9.4 MG/DL — SIGNIFICANT CHANGE UP (ref 8.4–10.5)
CHLORIDE SERPL-SCNC: 104 MMOL/L — SIGNIFICANT CHANGE UP (ref 96–108)
CO2 SERPL-SCNC: 21 MMOL/L — LOW (ref 22–31)
CO2 SERPL-SCNC: 21 MMOL/L — LOW (ref 22–31)
CO2 SERPL-SCNC: 22 MMOL/L — SIGNIFICANT CHANGE UP (ref 22–31)
CO2 SERPL-SCNC: 22 MMOL/L — SIGNIFICANT CHANGE UP (ref 22–31)
CREAT SERPL-MCNC: 0.52 MG/DL — SIGNIFICANT CHANGE UP (ref 0.5–1.3)
CREAT SERPL-MCNC: 0.52 MG/DL — SIGNIFICANT CHANGE UP (ref 0.5–1.3)
CREAT SERPL-MCNC: 0.53 MG/DL — SIGNIFICANT CHANGE UP (ref 0.5–1.3)
CREAT SERPL-MCNC: 0.53 MG/DL — SIGNIFICANT CHANGE UP (ref 0.5–1.3)
CULTURE RESULTS: NO GROWTH — SIGNIFICANT CHANGE UP
CULTURE RESULTS: NO GROWTH — SIGNIFICANT CHANGE UP
EGFR: 115 ML/MIN/1.73M2 — SIGNIFICANT CHANGE UP
EGFR: 115 ML/MIN/1.73M2 — SIGNIFICANT CHANGE UP
EGFR: 116 ML/MIN/1.73M2 — SIGNIFICANT CHANGE UP
EGFR: 116 ML/MIN/1.73M2 — SIGNIFICANT CHANGE UP
GLUCOSE SERPL-MCNC: 101 MG/DL — HIGH (ref 70–99)
GLUCOSE SERPL-MCNC: 101 MG/DL — HIGH (ref 70–99)
GLUCOSE SERPL-MCNC: 103 MG/DL — HIGH (ref 70–99)
GLUCOSE SERPL-MCNC: 103 MG/DL — HIGH (ref 70–99)
POTASSIUM SERPL-MCNC: 5.8 MMOL/L — HIGH (ref 3.5–5.3)
POTASSIUM SERPL-MCNC: 5.8 MMOL/L — HIGH (ref 3.5–5.3)
POTASSIUM SERPL-MCNC: 7.5 MMOL/L — CRITICAL HIGH (ref 3.5–5.3)
POTASSIUM SERPL-MCNC: 7.5 MMOL/L — CRITICAL HIGH (ref 3.5–5.3)
POTASSIUM SERPL-SCNC: 5.8 MMOL/L — HIGH (ref 3.5–5.3)
POTASSIUM SERPL-SCNC: 5.8 MMOL/L — HIGH (ref 3.5–5.3)
POTASSIUM SERPL-SCNC: 7.5 MMOL/L — CRITICAL HIGH (ref 3.5–5.3)
POTASSIUM SERPL-SCNC: 7.5 MMOL/L — CRITICAL HIGH (ref 3.5–5.3)
SODIUM SERPL-SCNC: 135 MMOL/L — SIGNIFICANT CHANGE UP (ref 135–145)
SODIUM SERPL-SCNC: 135 MMOL/L — SIGNIFICANT CHANGE UP (ref 135–145)
SODIUM SERPL-SCNC: 137 MMOL/L — SIGNIFICANT CHANGE UP (ref 135–145)
SODIUM SERPL-SCNC: 137 MMOL/L — SIGNIFICANT CHANGE UP (ref 135–145)
SPECIMEN SOURCE: SIGNIFICANT CHANGE UP
SPECIMEN SOURCE: SIGNIFICANT CHANGE UP

## 2023-10-24 PROCEDURE — 85610 PROTHROMBIN TIME: CPT

## 2023-10-24 PROCEDURE — 76801 OB US < 14 WKS SINGLE FETUS: CPT

## 2023-10-24 PROCEDURE — 36415 COLL VENOUS BLD VENIPUNCTURE: CPT

## 2023-10-24 PROCEDURE — 87086 URINE CULTURE/COLONY COUNT: CPT

## 2023-10-24 PROCEDURE — 81001 URINALYSIS AUTO W/SCOPE: CPT

## 2023-10-24 PROCEDURE — 76817 TRANSVAGINAL US OBSTETRIC: CPT

## 2023-10-24 PROCEDURE — 80048 BASIC METABOLIC PNL TOTAL CA: CPT

## 2023-10-24 PROCEDURE — 99285 EMERGENCY DEPT VISIT HI MDM: CPT | Mod: 25

## 2023-10-24 PROCEDURE — 93975 VASCULAR STUDY: CPT

## 2023-10-24 PROCEDURE — 85730 THROMBOPLASTIN TIME PARTIAL: CPT

## 2023-10-24 PROCEDURE — 85025 COMPLETE CBC W/AUTO DIFF WBC: CPT

## 2023-10-24 PROCEDURE — 80053 COMPREHEN METABOLIC PANEL: CPT

## 2023-10-24 PROCEDURE — 84702 CHORIONIC GONADOTROPIN TEST: CPT

## 2023-10-24 NOTE — CHART NOTE - NSCHARTNOTEFT_GEN_A_CORE
Patient's multiple blood draws showed severe hemolysis on potassium, moderate hemolysis of 5.8 w/ no symptoms at this time. Explained to patient that she will need repeat at OBGYN office when she visits for repeat hcg, no medical history to suggest cause of hyperkalemia at this time, patient understands. Final potassium drawn in ED was severely hemolyzed despite being taken from [facility] blood draw.

## 2023-10-31 ENCOUNTER — APPOINTMENT (OUTPATIENT)
Dept: OBGYN | Facility: CLINIC | Age: 48
End: 2023-10-31
Payer: COMMERCIAL

## 2023-10-31 ENCOUNTER — ASOB RESULT (OUTPATIENT)
Age: 48
End: 2023-10-31

## 2023-10-31 VITALS
BODY MASS INDEX: 27.64 KG/M2 | HEART RATE: 87 BPM | SYSTOLIC BLOOD PRESSURE: 135 MMHG | WEIGHT: 172 LBS | HEIGHT: 66 IN | DIASTOLIC BLOOD PRESSURE: 87 MMHG

## 2023-10-31 DIAGNOSIS — O36.80X0 PREGNANCY WITH INCONCLUSIVE FETAL VIABILITY, NOT APPLICABLE OR UNSPECIFIED: ICD-10-CM

## 2023-10-31 PROCEDURE — 99214 OFFICE O/P EST MOD 30 MIN: CPT

## 2023-10-31 PROCEDURE — 76817 TRANSVAGINAL US OBSTETRIC: CPT

## 2024-01-23 LAB
BACTERIA UR CULT: NORMAL
C TRACH RRNA SPEC QL NAA+PROBE: NOT DETECTED
HCG SERPL-MCNC: <1 MIU/ML
N GONORRHOEA RRNA SPEC QL NAA+PROBE: NOT DETECTED
SOURCE AMPLIFICATION: NORMAL

## 2024-04-03 NOTE — ED PROVIDER NOTE - NS ED MD DISPO DISCHARGE
No protocol for requested medication. Medication: Yuvafem 10 MCG vaginal tablet   Last office visit date: 01/24/2024  Pharmacy: [address]    Order pended, routed to clinician for review.
Home

## 2024-06-12 ENCOUNTER — APPOINTMENT (OUTPATIENT)
Dept: OBGYN | Facility: CLINIC | Age: 49
End: 2024-06-12

## 2025-03-27 ENCOUNTER — APPOINTMENT (OUTPATIENT)
Dept: OBGYN | Facility: CLINIC | Age: 50
End: 2025-03-27

## 2025-03-27 VITALS
BODY MASS INDEX: 27 KG/M2 | WEIGHT: 168 LBS | HEART RATE: 85 BPM | SYSTOLIC BLOOD PRESSURE: 144 MMHG | DIASTOLIC BLOOD PRESSURE: 99 MMHG | HEIGHT: 66 IN

## 2025-03-27 DIAGNOSIS — D64.9 ANEMIA, UNSPECIFIED: ICD-10-CM

## 2025-03-27 DIAGNOSIS — N92.0 EXCESSIVE AND FREQUENT MENSTRUATION WITH REGULAR CYCLE: ICD-10-CM

## 2025-03-27 PROCEDURE — 99213 OFFICE O/P EST LOW 20 MIN: CPT

## 2025-03-27 RX ORDER — TRANEXAMIC ACID 650 MG/1
650 TABLET ORAL
Qty: 30 | Refills: 4 | Status: ACTIVE | COMMUNITY
Start: 2025-03-27 | End: 1900-01-01

## 2025-04-11 ENCOUNTER — APPOINTMENT (OUTPATIENT)
Dept: OBGYN | Facility: CLINIC | Age: 50
End: 2025-04-11
Payer: COMMERCIAL

## 2025-04-11 ENCOUNTER — ASOB RESULT (OUTPATIENT)
Age: 50
End: 2025-04-11

## 2025-04-11 PROCEDURE — 76857 US EXAM PELVIC LIMITED: CPT | Mod: 59

## 2025-04-11 PROCEDURE — 76830 TRANSVAGINAL US NON-OB: CPT

## 2025-04-16 ENCOUNTER — APPOINTMENT (OUTPATIENT)
Dept: OBGYN | Facility: CLINIC | Age: 50
End: 2025-04-16

## 2025-05-15 ENCOUNTER — APPOINTMENT (OUTPATIENT)
Dept: GASTROENTEROLOGY | Facility: CLINIC | Age: 50
End: 2025-05-15
Payer: COMMERCIAL

## 2025-05-15 VITALS
DIASTOLIC BLOOD PRESSURE: 80 MMHG | WEIGHT: 168 LBS | OXYGEN SATURATION: 99 % | BODY MASS INDEX: 27 KG/M2 | TEMPERATURE: 97.5 F | HEART RATE: 86 BPM | SYSTOLIC BLOOD PRESSURE: 120 MMHG | HEIGHT: 66 IN

## 2025-05-15 DIAGNOSIS — D50.9 IRON DEFICIENCY ANEMIA, UNSPECIFIED: ICD-10-CM

## 2025-05-15 DIAGNOSIS — Z12.11 ENCOUNTER FOR SCREENING FOR MALIGNANT NEOPLASM OF COLON: ICD-10-CM

## 2025-05-15 PROCEDURE — 99203 OFFICE O/P NEW LOW 30 MIN: CPT

## 2025-05-15 RX ORDER — SODIUM SULFATE, POTASSIUM SULFATE AND MAGNESIUM SULFATE 1.6; 3.13; 17.5 G/177ML; G/177ML; G/177ML
17.5-3.13-1.6 SOLUTION ORAL
Qty: 1 | Refills: 0 | Status: ACTIVE | COMMUNITY
Start: 2025-05-15 | End: 1900-01-01

## 2025-05-15 RX ORDER — GLUC/MSM/COLGN2/HYAL/ANTIARTH3 375-375-20
TABLET ORAL
Refills: 0 | Status: ACTIVE | COMMUNITY

## 2025-05-15 RX ORDER — MULTIVITAMIN
TABLET ORAL
Refills: 0 | Status: ACTIVE | COMMUNITY

## 2025-05-15 RX ORDER — CHOLECALCIFEROL (VITAMIN D3) 25 MCG
TABLET ORAL
Refills: 0 | Status: ACTIVE | COMMUNITY

## 2025-05-21 ENCOUNTER — NON-APPOINTMENT (OUTPATIENT)
Age: 50
End: 2025-05-21

## 2025-05-21 ENCOUNTER — APPOINTMENT (OUTPATIENT)
Dept: OBGYN | Facility: CLINIC | Age: 50
End: 2025-05-21
Payer: COMMERCIAL

## 2025-05-21 VITALS
SYSTOLIC BLOOD PRESSURE: 148 MMHG | BODY MASS INDEX: 27.64 KG/M2 | HEART RATE: 103 BPM | WEIGHT: 172 LBS | DIASTOLIC BLOOD PRESSURE: 88 MMHG | HEIGHT: 66 IN

## 2025-05-21 DIAGNOSIS — N92.0 EXCESSIVE AND FREQUENT MENSTRUATION WITH REGULAR CYCLE: ICD-10-CM

## 2025-05-21 DIAGNOSIS — Z01.419 ENCOUNTER FOR GYNECOLOGICAL EXAMINATION (GENERAL) (ROUTINE) W/OUT ABNORMAL FINDINGS: ICD-10-CM

## 2025-05-21 PROCEDURE — 58100 BIOPSY OF UTERUS LINING: CPT

## 2025-05-21 PROCEDURE — 99459 PELVIC EXAMINATION: CPT | Mod: NC

## 2025-05-21 PROCEDURE — 96127 BRIEF EMOTIONAL/BEHAV ASSMT: CPT

## 2025-05-21 PROCEDURE — 99396 PREV VISIT EST AGE 40-64: CPT

## 2025-05-22 LAB — HPV HIGH+LOW RISK DNA PNL CVX: NOT DETECTED

## 2025-05-23 LAB — CORE LAB BIOPSY: NORMAL

## 2025-05-24 LAB — CYTOLOGY CVX/VAG DOC THIN PREP: ABNORMAL

## 2025-06-09 ENCOUNTER — APPOINTMENT (OUTPATIENT)
Dept: OBGYN | Facility: CLINIC | Age: 50
End: 2025-06-09

## 2025-06-09 PROCEDURE — 99213 OFFICE O/P EST LOW 20 MIN: CPT | Mod: 95

## 2025-06-09 RX ORDER — NORETHINDRONE ACETATE 5 MG/1
5 TABLET ORAL
Qty: 60 | Refills: 1 | Status: ACTIVE | COMMUNITY
Start: 2025-06-09 | End: 1900-01-01

## (undated) DEVICE — LABELS BLANK W PEN

## (undated) DEVICE — TUBING IRR SET FOR CYSTOSCOPY 77"

## (undated) DEVICE — ELCTR REM POLYHESIVE ADULT PT RETURN 15FT

## (undated) DEVICE — VISITEC 4X4

## (undated) DEVICE — SOL IRR POUR NS 0.9% 1000ML

## (undated) DEVICE — DRAPE LIGHT HANDLE COVER (GREEN)

## (undated) DEVICE — GOWN LG

## (undated) DEVICE — CABLE DAC ACTIVE CORD

## (undated) DEVICE — PACK PERI GYN

## (undated) DEVICE — TUBING SUCTION NONCONDUCTIVE 6MM X 12FT

## (undated) DEVICE — TUBING STRYKER HYSTEROSCOPY INFLOW OUTFLOW

## (undated) DEVICE — DRAPE IRRIGATION POUCH 19X23"

## (undated) DEVICE — POSITIONER STRAP ARMBOARD VELCRO TS-30

## (undated) DEVICE — PREP BETADINE SPONGE STICKS

## (undated) DEVICE — DRSG PAD SANITARY OB

## (undated) DEVICE — PROTECTOR HEEL / ELBOW FLUFFY

## (undated) DEVICE — GLV 7 PROTEXIS (WHITE)

## (undated) DEVICE — DRSG TELFA 3 X 8

## (undated) DEVICE — DRAPE UNDER BUTTOCKS W SCREEN

## (undated) DEVICE — DRAPE TOWEL BLUE 17" X 24"

## (undated) DEVICE — GOWN XL

## (undated) DEVICE — PRESSURE INFUSOR BAG 1000ML

## (undated) DEVICE — ELCTR CUTTING LOOP RIGHT ANGLE 24FR

## (undated) DEVICE — SYMPHION 3.6MM RESECTING DEVICE

## (undated) DEVICE — SYMPHION FLUID MANAGEMENT DEVICE

## (undated) DEVICE — BASIN SET DOUBLE

## (undated) DEVICE — SOL IRR BAG NS 0.9% 3000ML

## (undated) DEVICE — VENODYNE/SCD SLEEVE CALF MEDIUM